# Patient Record
Sex: FEMALE | Race: WHITE | NOT HISPANIC OR LATINO | Employment: FULL TIME | URBAN - METROPOLITAN AREA
[De-identification: names, ages, dates, MRNs, and addresses within clinical notes are randomized per-mention and may not be internally consistent; named-entity substitution may affect disease eponyms.]

---

## 2018-05-02 ENCOUNTER — LAB VISIT (OUTPATIENT)
Dept: LAB | Facility: HOSPITAL | Age: 58
End: 2018-05-02
Attending: FAMILY MEDICINE
Payer: COMMERCIAL

## 2018-05-02 ENCOUNTER — OFFICE VISIT (OUTPATIENT)
Dept: INTERNAL MEDICINE | Facility: CLINIC | Age: 58
End: 2018-05-02
Payer: COMMERCIAL

## 2018-05-02 ENCOUNTER — TELEPHONE (OUTPATIENT)
Dept: INTERNAL MEDICINE | Facility: CLINIC | Age: 58
End: 2018-05-02

## 2018-05-02 VITALS
BODY MASS INDEX: 28.31 KG/M2 | OXYGEN SATURATION: 99 % | SYSTOLIC BLOOD PRESSURE: 128 MMHG | HEIGHT: 60 IN | HEART RATE: 59 BPM | WEIGHT: 144.19 LBS | DIASTOLIC BLOOD PRESSURE: 72 MMHG

## 2018-05-02 DIAGNOSIS — I25.10 CORONARY ARTERY DISEASE INVOLVING NATIVE HEART WITHOUT ANGINA PECTORIS, UNSPECIFIED VESSEL OR LESION TYPE: ICD-10-CM

## 2018-05-02 DIAGNOSIS — Z51.81 ENCOUNTER FOR MONITORING LONG-TERM PROTON PUMP INHIBITOR THERAPY: ICD-10-CM

## 2018-05-02 DIAGNOSIS — Z80.0 FAMILY HISTORY OF ESOPHAGEAL CANCER: ICD-10-CM

## 2018-05-02 DIAGNOSIS — R06.2 WHEEZING ON AUSCULTATION: ICD-10-CM

## 2018-05-02 DIAGNOSIS — I10 ESSENTIAL HYPERTENSION: ICD-10-CM

## 2018-05-02 DIAGNOSIS — Z79.899 ENCOUNTER FOR MONITORING LONG-TERM PROTON PUMP INHIBITOR THERAPY: ICD-10-CM

## 2018-05-02 DIAGNOSIS — K21.9 GASTROESOPHAGEAL REFLUX DISEASE WITHOUT ESOPHAGITIS: ICD-10-CM

## 2018-05-02 DIAGNOSIS — Z00.00 ANNUAL PHYSICAL EXAM: ICD-10-CM

## 2018-05-02 DIAGNOSIS — Z13.6 ENCOUNTER FOR LIPID SCREENING FOR CARDIOVASCULAR DISEASE: ICD-10-CM

## 2018-05-02 DIAGNOSIS — Z00.00 ANNUAL PHYSICAL EXAM: Primary | ICD-10-CM

## 2018-05-02 DIAGNOSIS — Z12.4 SCREENING FOR CERVICAL CANCER: ICD-10-CM

## 2018-05-02 DIAGNOSIS — Z12.11 ENCOUNTER FOR SCREENING COLONOSCOPY: ICD-10-CM

## 2018-05-02 DIAGNOSIS — Z87.891 FORMER SMOKER: ICD-10-CM

## 2018-05-02 DIAGNOSIS — Z13.220 ENCOUNTER FOR LIPID SCREENING FOR CARDIOVASCULAR DISEASE: ICD-10-CM

## 2018-05-02 DIAGNOSIS — Z12.9 SCREENING FOR CANCER: ICD-10-CM

## 2018-05-02 DIAGNOSIS — Z12.2 ENCOUNTER FOR SCREENING FOR LUNG CANCER: ICD-10-CM

## 2018-05-02 DIAGNOSIS — Z12.31 SCREENING MAMMOGRAM, ENCOUNTER FOR: ICD-10-CM

## 2018-05-02 LAB
25(OH)D3+25(OH)D2 SERPL-MCNC: 11 NG/ML
ALBUMIN SERPL BCP-MCNC: 4.1 G/DL
ALP SERPL-CCNC: 101 U/L
ALT SERPL W/O P-5'-P-CCNC: 11 U/L
ANION GAP SERPL CALC-SCNC: 9 MMOL/L
AST SERPL-CCNC: 20 U/L
BASOPHILS # BLD AUTO: 0.02 K/UL
BASOPHILS NFR BLD: 0.3 %
BILIRUB SERPL-MCNC: 0.9 MG/DL
BUN SERPL-MCNC: 14 MG/DL
CALCIUM SERPL-MCNC: 9.8 MG/DL
CHLORIDE SERPL-SCNC: 102 MMOL/L
CHOLEST SERPL-MCNC: 162 MG/DL
CHOLEST/HDLC SERPL: 3.3 {RATIO}
CO2 SERPL-SCNC: 28 MMOL/L
CREAT SERPL-MCNC: 0.9 MG/DL
DIFFERENTIAL METHOD: NORMAL
EOSINOPHIL # BLD AUTO: 0.1 K/UL
EOSINOPHIL NFR BLD: 0.8 %
ERYTHROCYTE [DISTWIDTH] IN BLOOD BY AUTOMATED COUNT: 13.5 %
EST. GFR  (AFRICAN AMERICAN): >60 ML/MIN/1.73 M^2
EST. GFR  (NON AFRICAN AMERICAN): >60 ML/MIN/1.73 M^2
GLUCOSE SERPL-MCNC: 94 MG/DL
HCT VFR BLD AUTO: 42.1 %
HDLC SERPL-MCNC: 49 MG/DL
HDLC SERPL: 30.2 %
HGB BLD-MCNC: 13.7 G/DL
IMM GRANULOCYTES # BLD AUTO: 0.02 K/UL
IMM GRANULOCYTES NFR BLD AUTO: 0.3 %
LDLC SERPL CALC-MCNC: 98.6 MG/DL
LYMPHOCYTES # BLD AUTO: 1.5 K/UL
LYMPHOCYTES NFR BLD: 21.1 %
MAGNESIUM SERPL-MCNC: 2.1 MG/DL
MCH RBC QN AUTO: 30.2 PG
MCHC RBC AUTO-ENTMCNC: 32.5 G/DL
MCV RBC AUTO: 93 FL
MONOCYTES # BLD AUTO: 0.6 K/UL
MONOCYTES NFR BLD: 8.5 %
NEUTROPHILS # BLD AUTO: 5 K/UL
NEUTROPHILS NFR BLD: 69 %
NONHDLC SERPL-MCNC: 113 MG/DL
NRBC BLD-RTO: 0 /100 WBC
PLATELET # BLD AUTO: 224 K/UL
PMV BLD AUTO: 11.8 FL
POTASSIUM SERPL-SCNC: 4.4 MMOL/L
PROT SERPL-MCNC: 8.2 G/DL
RBC # BLD AUTO: 4.53 M/UL
SODIUM SERPL-SCNC: 139 MMOL/L
TRIGL SERPL-MCNC: 72 MG/DL
TSH SERPL DL<=0.005 MIU/L-ACNC: 1.29 UIU/ML
VIT B12 SERPL-MCNC: 229 PG/ML
WBC # BLD AUTO: 7.29 K/UL

## 2018-05-02 PROCEDURE — 99386 PREV VISIT NEW AGE 40-64: CPT | Mod: S$GLB,,, | Performed by: FAMILY MEDICINE

## 2018-05-02 PROCEDURE — 85025 COMPLETE CBC W/AUTO DIFF WBC: CPT

## 2018-05-02 PROCEDURE — 36415 COLL VENOUS BLD VENIPUNCTURE: CPT | Mod: PO

## 2018-05-02 PROCEDURE — 3074F SYST BP LT 130 MM HG: CPT | Mod: CPTII,S$GLB,, | Performed by: FAMILY MEDICINE

## 2018-05-02 PROCEDURE — 84443 ASSAY THYROID STIM HORMONE: CPT

## 2018-05-02 PROCEDURE — 82607 VITAMIN B-12: CPT

## 2018-05-02 PROCEDURE — 80053 COMPREHEN METABOLIC PANEL: CPT

## 2018-05-02 PROCEDURE — 3078F DIAST BP <80 MM HG: CPT | Mod: CPTII,S$GLB,, | Performed by: FAMILY MEDICINE

## 2018-05-02 PROCEDURE — 82306 VITAMIN D 25 HYDROXY: CPT

## 2018-05-02 PROCEDURE — 99999 PR PBB SHADOW E&M-NEW PATIENT-LVL V: CPT | Mod: PBBFAC,,, | Performed by: FAMILY MEDICINE

## 2018-05-02 PROCEDURE — 80061 LIPID PANEL: CPT

## 2018-05-02 PROCEDURE — 83735 ASSAY OF MAGNESIUM: CPT

## 2018-05-02 PROCEDURE — 83036 HEMOGLOBIN GLYCOSYLATED A1C: CPT

## 2018-05-02 RX ORDER — OMEPRAZOLE 40 MG/1
40 CAPSULE, DELAYED RELEASE ORAL DAILY
Qty: 90 CAPSULE | Refills: 0 | Status: SHIPPED | OUTPATIENT
Start: 2018-05-02 | End: 2018-07-30 | Stop reason: SDUPTHER

## 2018-05-02 RX ORDER — SIMVASTATIN 40 MG/1
40 TABLET, FILM COATED ORAL NIGHTLY
COMMUNITY
Start: 2018-03-19 | End: 2018-05-15

## 2018-05-02 RX ORDER — METOPROLOL SUCCINATE 50 MG/1
50 TABLET, EXTENDED RELEASE ORAL DAILY
COMMUNITY
End: 2018-05-02 | Stop reason: SDUPTHER

## 2018-05-02 RX ORDER — SIMETHICONE 125 MG
125 CAPSULE ORAL EVERY OTHER DAY
COMMUNITY
End: 2024-03-26 | Stop reason: ALTCHOICE

## 2018-05-02 RX ORDER — LOSARTAN POTASSIUM 100 MG/1
100 TABLET ORAL DAILY
COMMUNITY
Start: 2018-03-17 | End: 2018-08-28 | Stop reason: SDUPTHER

## 2018-05-02 RX ORDER — NITROGLYCERIN 0.4 MG/1
0.4 TABLET SUBLINGUAL EVERY 5 MIN PRN
COMMUNITY
End: 2019-06-13 | Stop reason: SDUPTHER

## 2018-05-02 RX ORDER — METOPROLOL SUCCINATE 50 MG/1
50 TABLET, EXTENDED RELEASE ORAL DAILY
Qty: 90 TABLET | Refills: 1 | Status: SHIPPED | OUTPATIENT
Start: 2018-05-02 | End: 2018-11-07 | Stop reason: SDUPTHER

## 2018-05-02 RX ORDER — OMEPRAZOLE 20 MG/1
20 CAPSULE, DELAYED RELEASE ORAL DAILY
COMMUNITY
End: 2018-05-02 | Stop reason: SDUPTHER

## 2018-05-02 RX ORDER — CLOPIDOGREL BISULFATE 75 MG/1
75 TABLET ORAL DAILY
COMMUNITY
Start: 2018-04-29 | End: 2018-05-15

## 2018-05-02 RX ORDER — ASPIRIN 81 MG/1
81 TABLET ORAL DAILY
COMMUNITY

## 2018-05-02 NOTE — TELEPHONE ENCOUNTER
schedule pt for gyn on 6/7/18. Cancelled x ray. Pt verbalized understanding. Pt has no further questions or concerns.

## 2018-05-02 NOTE — PROGRESS NOTES
Subjective:       Patient ID: Alanna Angelo is a 58 y.o. female.    Chief Complaint: Gas and Establish Care    HPI 59 y/o female former smoker (quit yesterday) with CAD s/p PTCA 2008 on Plavix, HTN is here to establish care.  She has the feeling like she has to burp but she cannot, she has been taking gas x which helps some, she feels a little nauseated, denies v/d/constipation, her normal is every 2-3 days, denies straining or hard stools, says she has heartburn and taking prilosec helps she has been on it for over 8 years, she takes it everyday and its controlled tried zantac and it did not help, denies cp/sob, cough for the past week, yellow sputum, had cold sx a week ago which resolved, denies urinary sx.  Sleeping fair, appetite good.  HTN: on bb and arb, bp above goal  GYN; hx of L breast biopsy benign 2001, mmg due  CAD: has 2 stents, followed by Dr. Lee cardiology, last seen a year ago, on bb, baby asa, Plavix  Colonoscopy: never done  Eye exam due  Dental utd  Vaccines: thinks she is utd  Reviewed outside records    Review of Systems   Constitutional: Negative for activity change, appetite change, fatigue and fever.   Respiratory: Negative for cough and shortness of breath.    Cardiovascular: Negative for chest pain, palpitations and leg swelling.   Gastrointestinal: Negative for constipation, diarrhea, nausea and vomiting.   Genitourinary: Negative for difficulty urinating and dysuria.   Skin: Negative for rash.   Neurological: Negative for dizziness and light-headedness.   Psychiatric/Behavioral: Negative for sleep disturbance.       Objective:      /72   Pulse (!) 59   Ht 5' (1.524 m)   Wt 65.4 kg (144 lb 2.9 oz)   SpO2 99%   BMI 28.16 kg/m²   Physical Exam   Constitutional: She appears well-developed and well-nourished.   HENT:   Head: Normocephalic and atraumatic.   Mouth/Throat: No oropharyngeal exudate.   Neck: Normal range of motion. Neck supple. No thyromegaly present.    Cardiovascular: Normal rate, regular rhythm and normal heart sounds.    Pulmonary/Chest: Effort normal. No respiratory distress. She has wheezes.   Abdominal: Soft. Bowel sounds are normal. She exhibits no distension. There is no tenderness.   Musculoskeletal: She exhibits no edema.   Lymphadenopathy:     She has no cervical adenopathy.   Neurological: She is alert.   Skin: Skin is warm and dry.   Psychiatric: She has a normal mood and affect.   Nursing note and vitals reviewed.      Assessment:       1. Annual physical exam    2. Essential hypertension    3. Screening mammogram, encounter for    4. Encounter for screening colonoscopy    5. Coronary artery disease involving native heart without angina pectoris, unspecified vessel or lesion type    6. Encounter for lipid screening for cardiovascular disease    7. Former smoker    8. Screening for cancer    9. Encounter for screening for lung cancer    10. Wheezing on auscultation    11. Encounter for monitoring long-term proton pump inhibitor therapy    12. Gastroesophageal reflux disease without esophagitis    13. Family history of esophageal cancer    14. Screening for cervical cancer        Plan:   Alanna was seen today for [facility] care.    Diagnoses and all orders for this visit:    Annual physical exam  -     CBC auto differential; Future  -     Comprehensive metabolic panel; Future  -     Hemoglobin A1c; Future  -     Lipid panel; Future  -     TSH; Future  -     Ambulatory referral to Obstetrics / Gynecology    Essential hypertension  -     CBC auto differential; Future  -     Comprehensive metabolic panel; Future  -     Hemoglobin A1c; Future  -     TSH; Future  -     metoprolol succinate (TOPROL-XL) 50 MG 24 hr tablet; Take 1 tablet (50 mg total) by mouth once daily.    Screening mammogram, encounter for  -     Mammo Digital Screening Bilat with Tomosynthesis CAD; Future    Encounter for screening colonoscopy  -     Case request GI:  COLONOSCOPY  -     Case request GI: ESOPHAGOGASTRODUODENOSCOPY (EGD)    Coronary artery disease involving native heart without angina pectoris, unspecified vessel or lesion type  -     CBC auto differential; Future  -     Comprehensive metabolic panel; Future  -     Hemoglobin A1c; Future  -     Lipid panel; Future  -     metoprolol succinate (TOPROL-XL) 50 MG 24 hr tablet; Take 1 tablet (50 mg total) by mouth once daily.    Encounter for lipid screening for cardiovascular disease  -     Lipid panel; Future    Former smoker  -     CT Chest Lung Screening Low Dose; Future  -     X-Ray Chest PA And Lateral; Future  -     Case request GI: ESOPHAGOGASTRODUODENOSCOPY (EGD)    Screening for cancer  -     CT Chest Lung Screening Low Dose; Future    Encounter for screening for lung cancer    Wheezing on auscultation  -     X-Ray Chest PA And Lateral; Future    Encounter for monitoring long-term proton pump inhibitor therapy  -     omeprazole (PRILOSEC) 40 MG capsule; Take 1 capsule (40 mg total) by mouth once daily.  -     Vitamin B12; Future  -     Vitamin D; Future  -     Magnesium; Future    Gastroesophageal reflux disease without esophagitis  -     Case request GI: ESOPHAGOGASTRODUODENOSCOPY (EGD)  -     omeprazole (PRILOSEC) 40 MG capsule; Take 1 capsule (40 mg total) by mouth once daily.  -     Vitamin B12; Future  -     Vitamin D; Future  -     Magnesium; Future    Family history of esophageal cancer  -     Case request GI: ESOPHAGOGASTRODUODENOSCOPY (EGD)    Screening for cervical cancer  -     Ambulatory referral to Obstetrics / Gynecology

## 2018-05-02 NOTE — TELEPHONE ENCOUNTER
Please her up with one of the ob/gyn here for pelvic, please cancel chest x-ray given she got in for her CT so fast.

## 2018-05-03 ENCOUNTER — TELEPHONE (OUTPATIENT)
Dept: INTERNAL MEDICINE | Facility: CLINIC | Age: 58
End: 2018-05-03

## 2018-05-03 DIAGNOSIS — R79.89 LOW VITAMIN B12 LEVEL: ICD-10-CM

## 2018-05-03 DIAGNOSIS — E55.9 VITAMIN D DEFICIENCY: ICD-10-CM

## 2018-05-03 LAB
ESTIMATED AVG GLUCOSE: 94 MG/DL
HBA1C MFR BLD HPLC: 4.9 %

## 2018-05-03 NOTE — TELEPHONE ENCOUNTER
Informed pt of Dr. Devries advice. Pt verbalized understanding. Pt states that she will  the vitamins and start taking them. Pt has no further questions or concerns.

## 2018-05-03 NOTE — TELEPHONE ENCOUNTER
Please let her know her blood work looks good.  She is vitamin D deficient.  I recommend she take over-the-counter vitamin D3 2000 international units daily.  Additionally, her B12 is very low.  I would recommend she start B12 shots.  I sent the prescription to the pharmacy so she can get them at home.

## 2018-05-07 ENCOUNTER — HOSPITAL ENCOUNTER (OUTPATIENT)
Dept: RADIOLOGY | Facility: HOSPITAL | Age: 58
Discharge: HOME | End: 2018-05-07
Attending: FAMILY MEDICINE
Payer: COMMERCIAL

## 2018-05-07 ENCOUNTER — TELEPHONE (OUTPATIENT)
Dept: INTERNAL MEDICINE | Facility: CLINIC | Age: 58
End: 2018-05-07

## 2018-05-07 ENCOUNTER — HOSPITAL ENCOUNTER (OUTPATIENT)
Dept: RADIOLOGY | Facility: HOSPITAL | Age: 58
Discharge: HOME | End: 2018-05-07
Attending: FAMILY MEDICINE

## 2018-05-07 ENCOUNTER — TELEPHONE (OUTPATIENT)
Dept: ENDOSCOPY | Facility: HOSPITAL | Age: 58
End: 2018-05-07

## 2018-05-07 DIAGNOSIS — Z12.9 SCREENING FOR CANCER: ICD-10-CM

## 2018-05-07 DIAGNOSIS — I25.10 CORONARY ARTERY DISEASE INVOLVING NATIVE CORONARY ARTERY OF NATIVE HEART WITHOUT ANGINA PECTORIS: ICD-10-CM

## 2018-05-07 DIAGNOSIS — F17.200 SMOKER: Primary | ICD-10-CM

## 2018-05-07 DIAGNOSIS — Z87.891 FORMER SMOKER: ICD-10-CM

## 2018-05-07 DIAGNOSIS — R93.89 ABNORMAL CHEST X-RAY: ICD-10-CM

## 2018-05-07 DIAGNOSIS — Z12.31 SCREENING MAMMOGRAM, ENCOUNTER FOR: ICD-10-CM

## 2018-05-07 PROCEDURE — 77067 SCR MAMMO BI INCL CAD: CPT | Mod: 26,,, | Performed by: RADIOLOGY

## 2018-05-07 PROCEDURE — 77067 SCR MAMMO BI INCL CAD: CPT | Mod: TC

## 2018-05-07 PROCEDURE — 76497 UNLISTED CT PROCEDURE: CPT | Mod: TC

## 2018-05-07 PROCEDURE — 77063 BREAST TOMOSYNTHESIS BI: CPT | Mod: 26,,, | Performed by: RADIOLOGY

## 2018-05-07 RX ORDER — CYANOCOBALAMIN 1000 UG/ML
INJECTION, SOLUTION INTRAMUSCULAR; SUBCUTANEOUS
Refills: 1 | COMMUNITY
Start: 2018-05-03 | End: 2018-05-07

## 2018-05-07 NOTE — TELEPHONE ENCOUNTER
Cardiology notes reviewed from outside physician do not show any stress test or angiogram, please find out when the last time she had a stress test done and see if we can get a copy of it from her previous cardiologist

## 2018-05-07 NOTE — TELEPHONE ENCOUNTER
Patient needs to be scheduled for EGD and Colonoscopy, can patient hold Plavix 5 days prior to procedures per Endoscopy protocol?

## 2018-05-07 NOTE — TELEPHONE ENCOUNTER
Schedule pt with pulmonary on 5/21/18 and cards on 5/15/18. Pt verbalized understanding. Pt has no further questions or concerns.

## 2018-05-07 NOTE — TELEPHONE ENCOUNTER
Called and discussed results of CT and mammogram.  Please assist with Pulmonary and Cardiology appointments.

## 2018-05-07 NOTE — TELEPHONE ENCOUNTER
Lm for cardiology office in regards stress test or angiogram. Pt states that she had an angiogram in the past.

## 2018-05-08 ENCOUNTER — TELEPHONE (OUTPATIENT)
Dept: ENDOSCOPY | Facility: HOSPITAL | Age: 58
End: 2018-05-08

## 2018-05-08 DIAGNOSIS — Z12.11 SPECIAL SCREENING FOR MALIGNANT NEOPLASMS, COLON: Primary | ICD-10-CM

## 2018-05-08 RX ORDER — SODIUM, POTASSIUM,MAG SULFATES 17.5-3.13G
1 SOLUTION, RECONSTITUTED, ORAL ORAL ONCE
Qty: 1 BOTTLE | Refills: 0 | Status: SHIPPED | OUTPATIENT
Start: 2018-05-08 | End: 2018-05-08

## 2018-05-08 NOTE — TELEPHONE ENCOUNTER
Spoke with [facility] and they state that they will get back with us in regards to the stress test and angiogram.

## 2018-05-08 NOTE — TELEPHONE ENCOUNTER
Carmen Devries MD   You 16 hours ago (4:30 PM)      yes (Routing comment)          You routed conversation to Carmen Devries MD 18 hours ago (2:20 PM)      You 18 hours ago (2:18 PM)         Patient needs to be scheduled for EGD and Colonoscopy, can patient hold Plavix 5 days prior to procedures per Endoscopy protocol?

## 2018-05-15 ENCOUNTER — HOSPITAL ENCOUNTER (OUTPATIENT)
Dept: CARDIOLOGY | Facility: CLINIC | Age: 58
Discharge: HOME | End: 2018-05-15
Payer: COMMERCIAL

## 2018-05-15 ENCOUNTER — OFFICE VISIT (OUTPATIENT)
Dept: CARDIOLOGY | Facility: CLINIC | Age: 58
End: 2018-05-15
Payer: COMMERCIAL

## 2018-05-15 VITALS
DIASTOLIC BLOOD PRESSURE: 67 MMHG | HEIGHT: 60 IN | SYSTOLIC BLOOD PRESSURE: 143 MMHG | BODY MASS INDEX: 28.99 KG/M2 | WEIGHT: 147.69 LBS | HEART RATE: 62 BPM

## 2018-05-15 DIAGNOSIS — I25.10 CORONARY ARTERY DISEASE INVOLVING NATIVE CORONARY ARTERY OF NATIVE HEART WITHOUT ANGINA PECTORIS: ICD-10-CM

## 2018-05-15 DIAGNOSIS — Z87.891 FORMER SMOKER: ICD-10-CM

## 2018-05-15 DIAGNOSIS — I10 ESSENTIAL HYPERTENSION: Primary | ICD-10-CM

## 2018-05-15 DIAGNOSIS — I10 ESSENTIAL HYPERTENSION: ICD-10-CM

## 2018-05-15 PROCEDURE — 3077F SYST BP >= 140 MM HG: CPT | Mod: CPTII,S$GLB,, | Performed by: INTERNAL MEDICINE

## 2018-05-15 PROCEDURE — 93000 ELECTROCARDIOGRAM COMPLETE: CPT | Mod: S$GLB,,, | Performed by: INTERNAL MEDICINE

## 2018-05-15 PROCEDURE — 3008F BODY MASS INDEX DOCD: CPT | Mod: CPTII,S$GLB,, | Performed by: INTERNAL MEDICINE

## 2018-05-15 PROCEDURE — 99999 PR PBB SHADOW E&M-EST. PATIENT-LVL III: CPT | Mod: PBBFAC,,, | Performed by: INTERNAL MEDICINE

## 2018-05-15 PROCEDURE — 99203 OFFICE O/P NEW LOW 30 MIN: CPT | Mod: S$GLB,,, | Performed by: INTERNAL MEDICINE

## 2018-05-15 PROCEDURE — 3078F DIAST BP <80 MM HG: CPT | Mod: CPTII,S$GLB,, | Performed by: INTERNAL MEDICINE

## 2018-05-15 RX ORDER — MELATONIN 1 MG/ML
2 LIQUID (ML) ORAL DAILY
COMMUNITY

## 2018-05-15 RX ORDER — ACETAMINOPHEN 500 MG
1 TABLET ORAL DAILY
COMMUNITY

## 2018-05-15 RX ORDER — ATORVASTATIN CALCIUM 40 MG/1
40 TABLET, FILM COATED ORAL DAILY
Qty: 90 TABLET | Refills: 3 | Status: SHIPPED | OUTPATIENT
Start: 2018-05-15 | End: 2019-05-18 | Stop reason: SDUPTHER

## 2018-05-15 NOTE — PATIENT INSTRUCTIONS
Stop plavix (clopidogrel).    Change simvastatin to atorvastatin 40 mg daily.    Fasting blood work in 3 months.    We discussed that 150 minutes a week of moderate intensity exercise is recommended to improve cardiovascular health.

## 2018-05-15 NOTE — PROGRESS NOTES
Subjective:   Patient ID:  Alanna Angelo is a 58 y.o. female who presents for evaluation of Coronary artery disease involving native coronary artery of       HPI: She presents today to establish care. She was previously followed by Dr Lee at . She has CAD and had PCi done in the early . She has no history of CHF or arrhythmias. She is currently asymptomatic. Alanna Angelo denies any chest pain, shortness of breath, PND, orthopnea, palpitations, leg edema, or syncope. She quit smoking two weeks ago. She works in day and takes care of her grandson. She does not exercise on a regular basis. Her diet is low is fruits and vegetables.    Past Medical History:   Diagnosis Date    Coronary artery disease     Hypertension        Past Surgical History:   Procedure Laterality Date    BREAST BIOPSY      BREAST SURGERY      L breast biopsy benign      SECTION      CORONARY ANGIOPLASTY WITH STENT PLACEMENT      2 stents    HEMORRHOID SURGERY         Social History     Social History    Marital status:      Spouse name: N/A    Number of children: 3    Years of education: N/A     Occupational History    Works at       Social History Main Topics    Smoking status: Former Smoker     Packs/day: 1.00     Years: 45.00     Types: Cigarettes     Start date: 1975     Quit date: 2018    Smokeless tobacco: Never Used    Alcohol use No    Drug use: Yes     Types: Marijuana      Comment: every so often used     Sexual activity: Not Asked     Other Topics Concern    None     Social History Narrative    None       Family History   Problem Relation Age of Onset    Rheum arthritis Mother     Stroke Father     Heart disease Father     Hypertension Father     Hyperlipidemia Father     Cancer Father         esophageal    Hypertension Brother     Hyperlipidemia Brother     Heart disease Brother     Diabetes Neg Hx        Patient's Medications   New Prescriptions     ATORVASTATIN (LIPITOR) 40 MG TABLET    Take 1 tablet (40 mg total) by mouth once daily.   Previous Medications    ASPIRIN (ECOTRIN) 81 MG EC TABLET    Take 81 mg by mouth once daily.    CHOLECALCIFEROL, VITAMIN D3, (VITAMIN D3) 2,000 UNIT CAP    Take 1 capsule by mouth once daily.    CYANOCOBALAMIN, VITAMIN B-12, 1,000 MCG/ML KIT    Inject 1,000 mcg as directed every 28 days.    LOSARTAN (COZAAR) 100 MG TABLET    Take 100 mg by mouth once daily.     METOPROLOL SUCCINATE (TOPROL-XL) 50 MG 24 HR TABLET    Take 1 tablet (50 mg total) by mouth once daily.    NITROGLYCERIN (NITROSTAT) 0.4 MG SL TABLET    Place 0.4 mg under the tongue every 5 (five) minutes as needed for Chest pain.    OMEGA-3 FATTY ACIDS-FISH OIL (FISH OIL) 360-1,200 MG CPDR    Take 2 capsules by mouth once daily.    OMEPRAZOLE (PRILOSEC) 40 MG CAPSULE    Take 1 capsule (40 mg total) by mouth once daily.    SIMETHICONE (GAS-X EXTRA STRENGTH) 125 MG CAP CAPSULE    Take 125 mg by mouth 4 (four) times daily as needed for Flatulence.   Modified Medications    No medications on file   Discontinued Medications    CLOPIDOGREL (PLAVIX) 75 MG TABLET    Take 75 mg by mouth once daily.     SIMVASTATIN (ZOCOR) 40 MG TABLET    Take 40 mg by mouth every evening.        Review of Systems   Constitution: Negative for weakness, malaise/fatigue and weight gain.   HENT: Negative for hearing loss.    Eyes: Negative for visual disturbance.   Cardiovascular: Negative for chest pain, claudication, dyspnea on exertion, leg swelling, near-syncope, orthopnea, palpitations, paroxysmal nocturnal dyspnea and syncope.   Respiratory: Negative for cough, shortness of breath, sleep disturbances due to breathing, snoring and wheezing.    Endocrine: Negative for cold intolerance, heat intolerance, polydipsia, polyphagia and polyuria.   Hematologic/Lymphatic: Negative for bleeding problem. Does not bruise/bleed easily.   Skin: Negative for rash and suspicious lesions.   Musculoskeletal:  Negative for arthritis, falls, joint pain, muscle weakness and myalgias.   Gastrointestinal: Negative for abdominal pain, change in bowel habit, constipation, diarrhea, heartburn, hematochezia, melena and nausea.   Genitourinary: Negative for hematuria and nocturia.   Neurological: Negative for excessive daytime sleepiness, dizziness, headaches, light-headedness and loss of balance.   Psychiatric/Behavioral: Negative for depression. The patient is not nervous/anxious.    Allergic/Immunologic: Negative for environmental allergies.       BP (!) 143/67 (BP Location: Left arm, Patient Position: Sitting, BP Method: Large (Automatic))   Pulse 62   Ht 5' (1.524 m)   Wt 67 kg (147 lb 11.3 oz)   BMI 28.85 kg/m²     Objective:   Physical Exam   Constitutional: She is oriented to person, place, and time. She appears well-developed and well-nourished.        HENT:   Head: Normocephalic and atraumatic.   Mouth/Throat: Oropharynx is clear and moist.   Eyes: Conjunctivae and EOM are normal. Pupils are equal, round, and reactive to light. No scleral icterus.   Neck: Normal range of motion. Neck supple. No hepatojugular reflux and no JVD present. No tracheal deviation present. No thyromegaly present.   Cardiovascular: Normal rate, regular rhythm, normal heart sounds and intact distal pulses.  PMI is not displaced.    Pulses:       Carotid pulses are 2+ on the right side, and 2+ on the left side.       Radial pulses are 2+ on the right side, and 2+ on the left side.        Dorsalis pedis pulses are 2+ on the right side, and 2+ on the left side.        Posterior tibial pulses are 2+ on the right side, and 2+ on the left side.   Pulmonary/Chest: Effort normal and breath sounds normal.   Abdominal: Soft. Bowel sounds are normal. She exhibits no distension and no mass. There is no hepatosplenomegaly. There is no tenderness.   Musculoskeletal: She exhibits no edema or tenderness.   Lymphadenopathy:     She has no cervical adenopathy.    Neurological: She is alert and oriented to person, place, and time.   Skin: Skin is warm and dry. No rash noted. No cyanosis or erythema. Nails show no clubbing.   Psychiatric: She has a normal mood and affect. Her speech is normal and behavior is normal.       Lab Results   Component Value Date     05/02/2018    K 4.4 05/02/2018     05/02/2018    CO2 28 05/02/2018    BUN 14 05/02/2018    CREATININE 0.9 05/02/2018    GLU 94 05/02/2018    HGBA1C 4.9 05/02/2018    MG 2.1 05/02/2018    AST 20 05/02/2018    ALT 11 05/02/2018    ALBUMIN 4.1 05/02/2018    PROT 8.2 05/02/2018    BILITOT 0.9 05/02/2018    WBC 7.29 05/02/2018    HGB 13.7 05/02/2018    HCT 42.1 05/02/2018    MCV 93 05/02/2018     05/02/2018    TSH 1.288 05/02/2018    CHOL 162 05/02/2018    HDL 49 05/02/2018    LDLCALC 98.6 05/02/2018    TRIG 72 05/02/2018       Assessment:     1. Essential hypertension : Her blood pressure was elevated today. She is going to start a home log. We discussed goal BP is < 130/80.   2. Former smoker : She quit two weeks ago.   3. Coronary artery disease involving native coronary artery of native heart without angina pectoris : She is asymptomatic. Stop plavix. Change simvastatin to atorvastatin 40 mg daily for high intensity treatment. Follow up FLP and liver enzymes in 3 months. Records requested from Dr Lee. We discussed that 150 minutes a week of moderate intensity exercise is recommended to improve cardiovascular health. Mediterranean style diet is recommended for CV health.         Plan:     Alanna was seen today for coronary artery disease involving native coronary artery of .    Diagnoses and all orders for this visit:    Essential hypertension  -     atorvastatin (LIPITOR) 40 MG tablet; Take 1 tablet (40 mg total) by mouth once daily.  -     Lipid panel; Future  -     Comprehensive metabolic panel; Future  -     EKG 12-lead; Future  -     Comprehensive metabolic panel; Future  -     Lipid panel;  Future    Former smoker  -     atorvastatin (LIPITOR) 40 MG tablet; Take 1 tablet (40 mg total) by mouth once daily.  -     Lipid panel; Future  -     Comprehensive metabolic panel; Future  -     EKG 12-lead; Future  -     Comprehensive metabolic panel; Future  -     Lipid panel; Future    Coronary artery disease involving native coronary artery of native heart without angina pectoris  -     atorvastatin (LIPITOR) 40 MG tablet; Take 1 tablet (40 mg total) by mouth once daily.  -     Lipid panel; Future  -     Comprehensive metabolic panel; Future  -     EKG 12-lead; Future  -     Comprehensive metabolic panel; Future  -     Lipid panel; Future        Thank you for allowing me to participate in this patient's care. Please do not hesitate to contact me with any questions or concerns.

## 2018-05-15 NOTE — LETTER
May 15, 2018      Carmen Devries MD  [address] [place] [address]  [address]  [place] [address]           [facility] - Cardiology  [address]  [place] [address]  Phone: [phone]          Patient: Alanna Angelo   MR Number: [id number]   YOB: 1960   Date of Visit: 5/15/2018       Dear Dr. Carmen Devries:    Thank you for referring Alanna Angelo to me for evaluation. Attached you will find relevant portions of my assessment and plan of care.    If you have questions, please do not hesitate to call me. I look forward to following Alanna Angelo along with you.    Sincerely,    Qing Wakefield MD    Enclosure  CC:  No Recipients    If you would like to receive this communication electronically, please contact [email or web address] or [phone] to request more information on [employer] access.    For providers and/or their staff who would like to refer a patient to [facility], please contact us through our one-stop-shop provider referral line, [facility] , at [phone].    If you feel you have received this communication in error or would no longer like to receive these types of communications, please e-mail [email or web address]

## 2018-05-17 ENCOUNTER — CLINICAL SUPPORT (OUTPATIENT)
Dept: INTERNAL MEDICINE | Facility: CLINIC | Age: 58
End: 2018-05-17
Payer: COMMERCIAL

## 2018-05-17 ENCOUNTER — TELEPHONE (OUTPATIENT)
Dept: INTERNAL MEDICINE | Facility: CLINIC | Age: 58
End: 2018-05-17

## 2018-05-17 VITALS — DIASTOLIC BLOOD PRESSURE: 70 MMHG | SYSTOLIC BLOOD PRESSURE: 136 MMHG

## 2018-05-17 NOTE — PROGRESS NOTES
Alanna Baer Gi 58 y.o. female is here today for Blood Pressure check.   History of HTN yes.      Patient verifies taking blood pressure medications. Last dose of blood pressure medication was taken at 6:20 AM. Patient took Losartan 100 mg and metoprolol 50 mg.       Current Outpatient Prescriptions:     aspirin (ECOTRIN) 81 MG EC tablet, Take 81 mg by mouth once daily., Disp: , Rfl:     atorvastatin (LIPITOR) 40 MG tablet, Take 1 tablet (40 mg total) by mouth once daily., Disp: 90 tablet, Rfl: 3    cholecalciferol, vitamin D3, (VITAMIN D3) 2,000 unit Cap, Take 1 capsule by mouth once daily., Disp: , Rfl:     cyanocobalamin, vitamin B-12, 1,000 mcg/mL Kit, Inject 1,000 mcg as directed every 28 days., Disp: 1 kit, Rfl: 1    losartan (COZAAR) 100 MG tablet, Take 100 mg by mouth once daily. , Disp: , Rfl:     metoprolol succinate (TOPROL-XL) 50 MG 24 hr tablet, Take 1 tablet (50 mg total) by mouth once daily., Disp: 90 tablet, Rfl: 1    nitroGLYCERIN (NITROSTAT) 0.4 MG SL tablet, Place 0.4 mg under the tongue every 5 (five) minutes as needed for Chest pain., Disp: , Rfl:     omega-3 fatty acids-fish oil (FISH OIL) 360-1,200 mg CpDR, Take 2 capsules by mouth once daily., Disp: , Rfl:     omeprazole (PRILOSEC) 40 MG capsule, Take 1 capsule (40 mg total) by mouth once daily., Disp: 90 capsule, Rfl: 0    simethicone (GAS-X EXTRA STRENGTH) 125 mg Cap capsule, Take 125 mg by mouth 4 (four) times daily as needed for Flatulence., Disp: , Rfl:     Does patient have record of home blood pressure readings no.     Patient is asymptomatic. Patient denies Headache, chest pain, blurred vision, left arm pain, right arm pain, leg pain, and/or swelling in the ankles or feet. Patient has no complaints today.     Blood pressure reading was 136/70, Pulse 60.     Pt's Home blood pressure machine read 130/76.     Pt's Home blood pressure machine is accurate.     Review of patient's allergies indicates:  No Known  Allergies  Creatinine   Date Value Ref Range Status   05/02/2018 0.9 0.5 - 1.4 mg/dL Final     Sodium   Date Value Ref Range Status   05/02/2018 139 136 - 145 mmol/L Final     Potassium   Date Value Ref Range Status   05/02/2018 4.4 3.5 - 5.1 mmol/L Final   ]    Dr. Devries notified.

## 2018-05-17 NOTE — TELEPHONE ENCOUNTER
Please let her know that I would like to see her back in 3 months we do not have to make any medication changes at this time.

## 2018-05-17 NOTE — TELEPHONE ENCOUNTER
Informed pt of Dr. Devries advise. Pt verbalized understanding. Pt states that she will call back to make f/u appt in 3 months.

## 2018-05-17 NOTE — TELEPHONE ENCOUNTER
Alanna Baer Gi 58 y.o. female is here today for Blood Pressure check.   History of HTN yes.      Patient verifies taking blood pressure medications. Last dose of blood pressure medication was taken at 6:20 AM. Patient took Losartan 100 mg and metoprolol 50 mg.     Does patient have record of home blood pressure readings no.     Patient is asymptomatic. Patient denies Headache, chest pain, blurred vision, left arm pain, right arm pain, leg pain, and/or swelling in the ankles or feet. Patient has no complaints today.     Blood pressure reading was 136/70, Pulse 60.     Pt's Home blood pressure machine read 130/76.     Pt's Home blood pressure machine is accurate.

## 2018-05-21 ENCOUNTER — OFFICE VISIT (OUTPATIENT)
Dept: PULMONOLOGY | Facility: CLINIC | Age: 58
End: 2018-05-21
Payer: COMMERCIAL

## 2018-05-21 ENCOUNTER — HOSPITAL ENCOUNTER (OUTPATIENT)
Dept: PULMONOLOGY | Facility: CLINIC | Age: 58
Discharge: HOME | End: 2018-05-21
Payer: COMMERCIAL

## 2018-05-21 VITALS
OXYGEN SATURATION: 98 % | SYSTOLIC BLOOD PRESSURE: 122 MMHG | BODY MASS INDEX: 29.3 KG/M2 | HEART RATE: 57 BPM | HEIGHT: 60 IN | RESPIRATION RATE: 14 BRPM | WEIGHT: 149.25 LBS | DIASTOLIC BLOOD PRESSURE: 64 MMHG

## 2018-05-21 DIAGNOSIS — R91.1 LUNG NODULE: ICD-10-CM

## 2018-05-21 DIAGNOSIS — J43.2 CENTRILOBULAR EMPHYSEMA: ICD-10-CM

## 2018-05-21 DIAGNOSIS — R91.8 MULTIPLE LUNG NODULES: ICD-10-CM

## 2018-05-21 DIAGNOSIS — F17.200 TOBACCO USE DISORDER: ICD-10-CM

## 2018-05-21 DIAGNOSIS — I25.10 CORONARY ARTERY DISEASE INVOLVING NATIVE CORONARY ARTERY OF NATIVE HEART WITHOUT ANGINA PECTORIS: Primary | ICD-10-CM

## 2018-05-21 LAB
PRE FEV1 FVC: 73
PRE FEV1: 1.8
PRE FVC: 2.47
PREDICTED FEV1 FVC: 83
PREDICTED FEV1: 2.12
PREDICTED FVC: 2.6

## 2018-05-21 PROCEDURE — 94729 DIFFUSING CAPACITY: CPT | Mod: S$GLB,,, | Performed by: INTERNAL MEDICINE

## 2018-05-21 PROCEDURE — 99204 OFFICE O/P NEW MOD 45 MIN: CPT | Mod: 25,S$GLB,, | Performed by: INTERNAL MEDICINE

## 2018-05-21 PROCEDURE — 94010 BREATHING CAPACITY TEST: CPT | Mod: S$GLB,,, | Performed by: INTERNAL MEDICINE

## 2018-05-21 PROCEDURE — 99999 PR PBB SHADOW E&M-EST. PATIENT-LVL IV: CPT | Mod: PBBFAC,,, | Performed by: INTERNAL MEDICINE

## 2018-05-21 PROCEDURE — 3008F BODY MASS INDEX DOCD: CPT | Mod: CPTII,S$GLB,, | Performed by: INTERNAL MEDICINE

## 2018-05-21 PROCEDURE — 3078F DIAST BP <80 MM HG: CPT | Mod: CPTII,S$GLB,, | Performed by: INTERNAL MEDICINE

## 2018-05-21 PROCEDURE — 3074F SYST BP LT 130 MM HG: CPT | Mod: CPTII,S$GLB,, | Performed by: INTERNAL MEDICINE

## 2018-05-21 NOTE — PATIENT INSTRUCTIONS
Spirometry and DLCO (phone report).  Urged to not restart smoking.  Repeat CT Chest and return visit 6 months.

## 2018-05-21 NOTE — PROGRESS NOTES
Subjective:       Patient ID: Alanna Angelo is a 58 y.o. female.    Chief Complaint: Abnormal Ct Scan    HPI HISTORY OF PRESENT ILLNESS:  Mrs. Angelo is a 58-year-old former smoker (quit   three weeks ago) who comes to follow up findings in a recent screening CT scan   of the chest.  She had this study as part of a recent general medical evaluation   due to her long smoking history.  She is not having any daily respiratory   symptoms.  She notes that since she stopped smoking three weeks ago, her morning   cough has begun to subside.  She does not feel that she is limited in daily   activities by shortness of breath or other respiratory symptoms.  She is not   having any nighttime respiratory problems.  She is not currently using any   prescribed medications for respiratory symptoms.    Mrs. Angelo does not know of any past lung problems.  She does have a history of   coronary artery disease and is status post coronary artery stent placement 6 to   8 years ago.  She is not having any prominent sinus or upper GI symptoms.    Mrs. Angelo estimates that she smoked as much as one pack of cigarettes per day   for 40 years.  As noted previously, she discontinued smoking within the last   three weeks.  Her family history is of note in that her father had tuberculosis.    Otherwise, she does not know of any family history for lung diseases.  She   recalls being screened as a child for tuberculosis and her screening studies   were negative.  She currently works in a nursery school.    DATA:  I reviewed the images from the recent CT scan of the chest.  There are no   acute cardiovascular abnormalities.  There is paraseptal emphysema, which is most   noticeable in the apices of the lungs.  There are also a few scattered cysts in   the other lung zones.  There are several subcentimeter nodular densities in the right   upper lobe.  There are no pleural abnormalities.  There are no   prior CT images available for  comparison.      CB/IN  dd: 05/21/2018 19:48:25 (CDT)  td: 05/22/2018 12:07:00 (CDT)  Doc ID   #[id number]  Job ID #[id number]    CC:       Review of Systems   Constitutional: Negative for fever and fatigue.   HENT: Negative for postnasal drip, sinus pressure, voice change and congestion.    Respiratory: Positive for cough and sputum production. Negative for shortness of breath, wheezing and dyspnea on extertion.    Cardiovascular: Negative for chest pain and leg swelling.   Genitourinary: Negative for difficulty urinating.   Musculoskeletal: Negative for arthralgias and back pain.   Skin: Negative for rash.   Gastrointestinal: Negative for abdominal pain and acid reflux.   Neurological: Negative for dizziness and weakness.   Hematological: Negative for adenopathy.       Objective:      Physical Exam   Constitutional: She is oriented to person, place, and time. She appears well-developed and well-nourished.   HENT:   Head: Normocephalic.   Nose: Nose normal.   Mouth/Throat: No oropharyngeal exudate.   Neck: Normal range of motion. No JVD present. No tracheal deviation present. No thyromegaly present.   Mild hoarseness.   Cardiovascular: Normal rate, regular rhythm and normal heart sounds.    No murmur heard.  Pulmonary/Chest: Symmetric chest wall expansion. No stridor. She has no wheezes. She has no rhonchi. She has no rales. She exhibits no tenderness.   Abdominal: Soft. There is no tenderness.   Musculoskeletal: She exhibits no edema.   Lymphadenopathy:     She has no cervical adenopathy.   Neurological: She is alert and oriented to person, place, and time.   Skin: Skin is warm and dry. No rash noted. No erythema. Nails show no clubbing.   Psychiatric: She has a normal mood and affect.   Vitals reviewed.    Personal Diagnostic Review    No flowsheet data found.      Assessment:       1. Coronary artery disease involving native coronary artery of native heart without angina pectoris    2. Multiple lung nodules    3.  Centrilobular emphysema    4. Tobacco use disorder    5. Lung nodule        Outpatient Encounter Prescriptions as of 5/21/2018   Medication Sig Dispense Refill    aspirin (ECOTRIN) 81 MG EC tablet Take 81 mg by mouth once daily.      atorvastatin (LIPITOR) 40 MG tablet Take 1 tablet (40 mg total) by mouth once daily. 90 tablet 3    cholecalciferol, vitamin D3, (VITAMIN D3) 2,000 unit Cap Take 1 capsule by mouth once daily.      cyanocobalamin, vitamin B-12, 1,000 mcg/mL Kit Inject 1,000 mcg as directed every 28 days. 1 kit 1    losartan (COZAAR) 100 MG tablet Take 100 mg by mouth once daily.       metoprolol succinate (TOPROL-XL) 50 MG 24 hr tablet Take 1 tablet (50 mg total) by mouth once daily. 90 tablet 1    nitroGLYCERIN (NITROSTAT) 0.4 MG SL tablet Place 0.4 mg under the tongue every 5 (five) minutes as needed for Chest pain.      omega-3 fatty acids-fish oil (FISH OIL) 360-1,200 mg CpDR Take 2 capsules by mouth once daily.      omeprazole (PRILOSEC) 40 MG capsule Take 1 capsule (40 mg total) by mouth once daily. 90 capsule 0    simethicone (GAS-X EXTRA STRENGTH) 125 mg Cap capsule Take 125 mg by mouth 4 (four) times daily as needed for Flatulence.       No facility-administered encounter medications on file as of 5/21/2018.      Orders Placed This Encounter   Procedures    CT Chest Without Contrast     Standing Status:   Future     Standing Expiration Date:   5/21/2019     Order Specific Question:   May the Radiologist modify the order per protocol to meet the clinical needs of the patient?     Answer:   Yes    Spirometry without bronchodilator     Standing Status:   Future     Number of Occurrences:   1     Standing Expiration Date:   5/21/2019    DLCO-Carbon Monoxide Diffusing Capacity     Standing Status:   Future     Number of Occurrences:   1     Standing Expiration Date:   5/21/2019     Plan:     Spirometry and DLCO (phone report).  Urged to not restart smoking.  Repeat CT Chest and return  visit 6 months.

## 2018-05-21 NOTE — LETTER
May 21, 2018      Carmen Devries MD  [address]  [address]  [place] [address]           [facility] - Pulmonary [facility]  [address]  [place] [address]  Phone: [phone]          Patient: Alanna Angelo   MR Number: [id number]   YOB: 1960   Date of Visit: 5/21/2018       Dear Dr. Carmen Devries:    Thank you for referring Alanna Angelo to me for evaluation. Attached you will find relevant portions of my assessment and plan of care.    If you have questions, please do not hesitate to call me. I look forward to following lAanna Angelo along with you.    Sincerely,    JR Vega MD    Enclosure  CC:  No Recipients    If you would like to receive this communication electronically, please contact [email or web address] or [phone] to request more information on TelePharm Link access.    For providers and/or their staff who would like to refer a patient to [facility], please contact us through our one-stop-shop provider referral line, [facility] [place], at [phone].    If you feel you have received this communication in error or would no longer like to receive these types of communications, please e-mail [email or web address]

## 2018-05-24 ENCOUNTER — TELEPHONE (OUTPATIENT)
Dept: PULMONOLOGY | Facility: CLINIC | Age: 58
End: 2018-05-24

## 2018-05-25 NOTE — TELEPHONE ENCOUNTER
Pt informed that recent PFTs show spirometry and DLCO are within the range of normal.  Plan repeat CT and return visit in 6 months to monitor lung nodules.

## 2018-06-08 DIAGNOSIS — Z11.59 NEED FOR HEPATITIS C SCREENING TEST: ICD-10-CM

## 2018-06-11 ENCOUNTER — SURGERY (OUTPATIENT)
Age: 58
End: 2018-06-11

## 2018-06-11 ENCOUNTER — HOSPITAL ENCOUNTER (OUTPATIENT)
Facility: HOSPITAL | Age: 58
Discharge: HOME | End: 2018-06-11
Attending: INTERNAL MEDICINE | Admitting: INTERNAL MEDICINE
Payer: COMMERCIAL

## 2018-06-11 ENCOUNTER — ANESTHESIA (OUTPATIENT)
Dept: ENDOSCOPY | Facility: HOSPITAL | Age: 58
End: 2018-06-11
Payer: COMMERCIAL

## 2018-06-11 ENCOUNTER — ANESTHESIA EVENT (OUTPATIENT)
Dept: ENDOSCOPY | Facility: HOSPITAL | Age: 58
End: 2018-06-11
Payer: COMMERCIAL

## 2018-06-11 VITALS
SYSTOLIC BLOOD PRESSURE: 129 MMHG | WEIGHT: 143 LBS | DIASTOLIC BLOOD PRESSURE: 56 MMHG | HEIGHT: 64 IN | BODY MASS INDEX: 24.41 KG/M2 | RESPIRATION RATE: 18 BRPM | HEART RATE: 53 BPM | TEMPERATURE: 98 F | OXYGEN SATURATION: 99 %

## 2018-06-11 DIAGNOSIS — K21.9 GASTROESOPHAGEAL REFLUX DISEASE WITHOUT ESOPHAGITIS: Primary | ICD-10-CM

## 2018-06-11 DIAGNOSIS — Z12.11 COLON CANCER SCREENING: ICD-10-CM

## 2018-06-11 PROCEDURE — 43239 EGD BIOPSY SINGLE/MULTIPLE: CPT | Mod: 51,,, | Performed by: INTERNAL MEDICINE

## 2018-06-11 PROCEDURE — 63600175 PHARM REV CODE 636 W HCPCS: Performed by: NURSE ANESTHETIST, CERTIFIED REGISTERED

## 2018-06-11 PROCEDURE — 88305 TISSUE EXAM BY PATHOLOGIST: CPT | Performed by: PATHOLOGY

## 2018-06-11 PROCEDURE — G0121 COLON CA SCRN NOT HI RSK IND: HCPCS | Performed by: INTERNAL MEDICINE

## 2018-06-11 PROCEDURE — 37000008 HC ANESTHESIA 1ST 15 MINUTES: Performed by: INTERNAL MEDICINE

## 2018-06-11 PROCEDURE — 27201012 HC FORCEPS, HOT/COLD, DISP: Performed by: INTERNAL MEDICINE

## 2018-06-11 PROCEDURE — 88305 TISSUE EXAM BY PATHOLOGIST: CPT | Mod: 26,,, | Performed by: PATHOLOGY

## 2018-06-11 PROCEDURE — E9220 PRA ENDO ANESTHESIA: HCPCS | Mod: ,,, | Performed by: NURSE ANESTHETIST, CERTIFIED REGISTERED

## 2018-06-11 PROCEDURE — 25000003 PHARM REV CODE 250: Performed by: INTERNAL MEDICINE

## 2018-06-11 PROCEDURE — G0121 COLON CA SCRN NOT HI RSK IND: HCPCS | Mod: ,,, | Performed by: INTERNAL MEDICINE

## 2018-06-11 PROCEDURE — 43239 EGD BIOPSY SINGLE/MULTIPLE: CPT | Performed by: INTERNAL MEDICINE

## 2018-06-11 PROCEDURE — 37000009 HC ANESTHESIA EA ADD 15 MINS: Performed by: INTERNAL MEDICINE

## 2018-06-11 RX ORDER — SODIUM CHLORIDE 9 MG/ML
INJECTION, SOLUTION INTRAVENOUS CONTINUOUS
Status: DISCONTINUED | OUTPATIENT
Start: 2018-06-11 | End: 2018-06-11 | Stop reason: HOSPADM

## 2018-06-11 RX ORDER — PROPOFOL 10 MG/ML
VIAL (ML) INTRAVENOUS
Status: DISCONTINUED | OUTPATIENT
Start: 2018-06-11 | End: 2018-06-11

## 2018-06-11 RX ORDER — CLOPIDOGREL BISULFATE 75 MG/1
75 TABLET ORAL DAILY
COMMUNITY
End: 2018-07-16

## 2018-06-11 RX ORDER — LIDOCAINE HCL/PF 100 MG/5ML
SYRINGE (ML) INTRAVENOUS
Status: DISCONTINUED | OUTPATIENT
Start: 2018-06-11 | End: 2018-06-11

## 2018-06-11 RX ADMIN — PROPOFOL 30 MG: 10 INJECTION, EMULSION INTRAVENOUS at 07:06

## 2018-06-11 RX ADMIN — PROPOFOL 20 MG: 10 INJECTION, EMULSION INTRAVENOUS at 07:06

## 2018-06-11 RX ADMIN — LIDOCAINE HYDROCHLORIDE 75 MG: 20 INJECTION, SOLUTION INTRAVENOUS at 07:06

## 2018-06-11 RX ADMIN — PROPOFOL 20 MG: 10 INJECTION, EMULSION INTRAVENOUS at 08:06

## 2018-06-11 RX ADMIN — PROPOFOL 30 MG: 10 INJECTION, EMULSION INTRAVENOUS at 08:06

## 2018-06-11 RX ADMIN — PROPOFOL 100 MG: 10 INJECTION, EMULSION INTRAVENOUS at 07:06

## 2018-06-11 RX ADMIN — SODIUM CHLORIDE: 0.9 INJECTION, SOLUTION INTRAVENOUS at 07:06

## 2018-06-11 NOTE — H&P
Short Stay Endoscopy History and Physical    PCP - Carmen Devries MD    Procedure - EGD/Colonoscopy  ASA - per anesthesia  Mallampati - per anesthesia  History of Anesthesia problems - no  Family history Anesthesia problems -  no   Plan of anesthesia - MAC    HPI:  This is a 58 y.o. female here for evaluation of :     EGD - gerd  Colon - screening    ROS:  Constitutional: No fevers, chills, No weight loss  CV: No chest pain  Pulm: No cough, No shortness of breath  Ophtho: No vision changes  GI: see HPI  Derm: No rash    Medical History:  has a past medical history of Coronary artery disease and Hypertension.    Surgical History:  has a past surgical history that includes Breast surgery; Hemorrhoid surgery;  section; Breast biopsy; and Coronary angioplasty with stent ().    Family History: family history includes Cancer in her father; Heart disease in her brother and father; Hyperlipidemia in her brother and father; Hypertension in her brother and father; Rheum arthritis in her mother; Stroke in her father; Tuberculosis in her father.. Otherwise no colon cancer, inflammatory bowel disease, or GI malignancies.    Social History:  reports that she quit smoking about 5 weeks ago. Her smoking use included Cigarettes. She started smoking about 43 years ago. She has a 40.00 pack-year smoking history. She has never used smokeless tobacco. She reports that she does not drink alcohol or use drugs.    Review of patient's allergies indicates:  No Known Allergies    Medications:   Prescriptions Prior to Admission   Medication Sig Dispense Refill Last Dose    aspirin (ECOTRIN) 81 MG EC tablet Take 81 mg by mouth once daily.   6/10/2018 at Unknown time    atorvastatin (LIPITOR) 40 MG tablet Take 1 tablet (40 mg total) by mouth once daily. 90 tablet 3 6/10/2018 at Unknown time    cholecalciferol, vitamin D3, (VITAMIN D3) 2,000 unit Cap Take 1 capsule by mouth once daily.   2018 at 0530    clopidogrel (PLAVIX)  75 mg tablet Take 75 mg by mouth once daily.   6/1/2018    cyanocobalamin, vitamin B-12, 1,000 mcg/mL Kit Inject 1,000 mcg as directed every 28 days. 1 kit 1 Past Month at Unknown time    losartan (COZAAR) 100 MG tablet Take 100 mg by mouth once daily.    6/11/2018 at 0530    metoprolol succinate (TOPROL-XL) 50 MG 24 hr tablet Take 1 tablet (50 mg total) by mouth once daily. 90 tablet 1 6/11/2018 at 0530    omega-3 fatty acids-fish oil (FISH OIL) 360-1,200 mg CpDR Take 2 capsules by mouth once daily.   6/11/2018 at 0530    omeprazole (PRILOSEC) 40 MG capsule Take 1 capsule (40 mg total) by mouth once daily. 90 capsule 0 6/11/2018 at 0530    simethicone (GAS-X EXTRA STRENGTH) 125 mg Cap capsule Take 125 mg by mouth 4 (four) times daily as needed for Flatulence.   Past Week at Unknown time    nitroGLYCERIN (NITROSTAT) 0.4 MG SL tablet Place 0.4 mg under the tongue every 5 (five) minutes as needed for Chest pain.   Unknown at Unknown time       Physical Exam:    Vital Signs:   Vitals:    06/11/18 0722   BP: (!) 142/68   Pulse: 66   Resp: 16   Temp: 97.9 °F (36.6 °C)       General Appearance: Well appearing in no acute distress  Eyes:    No scleral icterus  ENT: Neck supple, Lips, mucosa, and tongue normal; teeth and gums normal  Lungs: CTA anteriorly  Heart:  Regular rate, S1, S2 normal, no murmurs heard.  Abdomen: Soft, non tender, non distended with normal bowel sounds. No hepatosplenomegaly, ascites, or mass.  Extremities: No edema  Skin: No rash    Labs:  Lab Results   Component Value Date    WBC 7.29 05/02/2018    HGB 13.7 05/02/2018    HCT 42.1 05/02/2018     05/02/2018    CHOL 162 05/02/2018    TRIG 72 05/02/2018    HDL 49 05/02/2018    ALT 11 05/02/2018    AST 20 05/02/2018     05/02/2018    K 4.4 05/02/2018     05/02/2018    CREATININE 0.9 05/02/2018    BUN 14 05/02/2018    CO2 28 05/02/2018    TSH 1.288 05/02/2018    HGBA1C 4.9 05/02/2018       I have explained the risks and benefits  of endoscopy procedures to the patient including but not limited to bleeding, perforation, infection, and death.      Yuri Ewing MD

## 2018-06-11 NOTE — [employer] PATIENT INSTRUCTIONS
Discharge Summary/Instructions after an Endoscopic Procedure  Patient Name: Alanna Angelo  Patient MRN: [id number]  Patient YOB: 1960  Monday, June 11, 2018  Yuri Ewing MD  RESTRICTIONS:  During your procedure today, you received medications for sedation.  These   medications may affect your judgment, balance and coordination.  Therefore,   for 24 hours, you have the following restrictions:   - DO NOT drive a car, operate machinery, make legal/financial decisions,   sign important papers or drink alcohol.    ACTIVITY:  Today: no heavy lifting, straining or running due to procedural   sedation/anesthesia.  The following day: return to full activity including work.  DIET:  Eat and drink normally unless instructed otherwise.     TREATMENT FOR COMMON SIDE EFFECTS:  - Mild abdominal pain, nausea, belching, bloating or excessive gas:  rest,   eat lightly and use a heating pad.  - Sore Throat: treat with throat lozenges and/or gargle with warm salt   water.  - Because air was used during the procedure, expelling large amounts of air   from your rectum or belching is normal.  - If a bowel prep was taken, you may not have a bowel movement for 1-3 days.    This is normal.  SYMPTOMS TO WATCH FOR AND REPORT TO YOUR PHYSICIAN:  1. Abdominal pain or bloating, other than gas cramps.  2. Chest pain.  3. Back pain.  4. Signs of infection such as: chills or fever occurring within 24 hours   after the procedure.  5. Rectal bleeding, which would show as bright red, maroon, or black stools.   (A tablespoon of blood from the rectum is not serious, especially if   hemorrhoids are present.)  6. Vomiting.  7. Weakness or dizziness.  GO DIRECTLY TO THE NEAREST EMERGENCY ROOM IF YOU HAVE ANY OF THE FOLLOWING:      Difficulty breathing              Chills and/or fever over 101 F   Persistent vomiting and/or vomiting blood   Severe abdominal pain   Severe chest pain   Black, tarry stools   Bleeding- more than one tablespoon   Any other  symptom or condition that you feel may need urgent attention  Your doctor recommends these additional instructions:  If any biopsies were taken, your doctors clinic will contact you in 1 to 2   weeks with any results.  - Discharge patient to home.   - Await pathology results.   - Repeat upper endoscopy in 3 years for surveillance based on pathology   results.   - The findings and recommendations were discussed with the designated   responsible adult.   For questions, problems or results please call your physician - Yuri Ewing MD at Work:  [phone].  [facility], EMERGENCY ROOM PHONE NUMBER: [phone]  IF A COMPLICATION OR EMERGENCY SITUATION ARISES AND YOU ARE UNABLE TO REACH   YOUR PHYSICIAN - GO DIRECTLY TO THE EMERGENCY ROOM.  Yuri Ewing MD  6/11/2018 7:50:35 AM  This report has been verified and signed electronically.  [employer]

## 2018-06-11 NOTE — ANESTHESIA POSTPROCEDURE EVALUATION
"Anesthesia Post Evaluation    Patient: Alanna Angelo    Procedure(s) Performed: Procedure(s) (LRB):  ESOPHAGOGASTRODUODENOSCOPY (EGD) (N/A)  COLONOSCOPY (N/A)    Final Anesthesia Type: general  Patient location during evaluation: PACU  Patient participation: Yes- Able to Participate  Level of consciousness: awake and alert and oriented  Post-procedure vital signs: reviewed and stable  Pain management: adequate  Airway patency: patent  PONV status at discharge: No PONV  Anesthetic complications: no      Cardiovascular status: stable  Respiratory status: unassisted, spontaneous ventilation and room air  Hydration status: euvolemic  Follow-up not needed.        Visit Vitals  BP (!) 114/56 (BP Location: Left arm, Patient Position: Lying)   Pulse (!) 57   Temp 36.5 °C (97.7 °F) (Temporal)   Resp 18   Ht 5' 4" (1.626 m)   Wt 64.9 kg (143 lb)   SpO2 100%   Breastfeeding? No   BMI 24.55 kg/m²       Pain/Derick Score: Pain Assessment Performed: Yes (6/11/2018  8:16 AM)  Presence of Pain: non-verbal indicators present (6/11/2018  8:16 AM)  Derick Score: 10 (6/11/2018  8:16 AM)      "

## 2018-06-11 NOTE — PROVATION PATIENT INSTRUCTIONS
Discharge Summary/Instructions after an Endoscopic Procedure  Patient Name: Alanna Angelo  Patient MRN: [id number]  Patient YOB: 1960  Monday, June 11, 2018  Yuri Ewing MD  RESTRICTIONS:  During your procedure today, you received medications for sedation.  These   medications may affect your judgment, balance and coordination.  Therefore,   for 24 hours, you have the following restrictions:   - DO NOT drive a car, operate machinery, make legal/financial decisions,   sign important papers or drink alcohol.    ACTIVITY:  Today: no heavy lifting, straining or running due to procedural   sedation/anesthesia.  The following day: return to full activity including work.  DIET:  Eat and drink normally unless instructed otherwise.     TREATMENT FOR COMMON SIDE EFFECTS:  - Mild abdominal pain, nausea, belching, bloating or excessive gas:  rest,   eat lightly and use a heating pad.  - Sore Throat: treat with throat lozenges and/or gargle with warm salt   water.  - Because air was used during the procedure, expelling large amounts of air   from your rectum or belching is normal.  - If a bowel prep was taken, you may not have a bowel movement for 1-3 days.    This is normal.  SYMPTOMS TO WATCH FOR AND REPORT TO YOUR PHYSICIAN:  1. Abdominal pain or bloating, other than gas cramps.  2. Chest pain.  3. Back pain.  4. Signs of infection such as: chills or fever occurring within 24 hours   after the procedure.  5. Rectal bleeding, which would show as bright red, maroon, or black stools.   (A tablespoon of blood from the rectum is not serious, especially if   hemorrhoids are present.)  6. Vomiting.  7. Weakness or dizziness.  GO DIRECTLY TO THE NEAREST EMERGENCY ROOM IF YOU HAVE ANY OF THE FOLLOWING:      Difficulty breathing              Chills and/or fever over 101 F   Persistent vomiting and/or vomiting blood   Severe abdominal pain   Severe chest pain   Black, tarry stools   Bleeding- more than one tablespoon   Any other  symptom or condition that you feel may need urgent attention  Your doctor recommends these additional instructions:  If any biopsies were taken, your doctors clinic will contact you in 1 to 2   weeks with any results.  - Patient has a contact number available for emergencies.  The signs and   symptoms of potential delayed complications were discussed with the   patient.  Return to normal activities tomorrow.  Written discharge   instructions were provided to the patient.   - Discharge patient to home.   - Patient is not scheduled to restart Plavix per cardiology.  - Repeat colonoscopy in 10 years for screening purposes.   - The findings and recommendations were discussed with the designated   responsible adult.   For questions, problems or results please call your physician - Yuri Ewing MD at Work:  [phone].  [facility], EMERGENCY ROOM PHONE NUMBER: [phone]  IF A COMPLICATION OR EMERGENCY SITUATION ARISES AND YOU ARE UNABLE TO REACH   YOUR PHYSICIAN - GO DIRECTLY TO THE EMERGENCY ROOM.  Yuri Ewing MD  6/11/2018 8:08:25 AM  This report has been verified and signed electronically.  [employer]

## 2018-06-11 NOTE — TRANSFER OF CARE
"Anesthesia Transfer of Care Note    Patient: Alanna Angelo    Procedure(s) Performed: Procedure(s) (LRB):  ESOPHAGOGASTRODUODENOSCOPY (EGD) (N/A)  COLONOSCOPY (N/A)    Patient location: PACU    Anesthesia Type: general    Transport from OR: Transported from OR on room air with adequate spontaneous ventilation    Post pain: adequate analgesia    Post assessment: no apparent anesthetic complications    Post vital signs: stable    Level of consciousness: awake and alert    Nausea/Vomiting: no nausea/vomiting    Complications: none    Transfer of care protocol was followed      Last vitals:   Visit Vitals  BP (!) 142/68 (BP Location: Left arm, Patient Position: Sitting)   Pulse 66   Temp 36.6 °C (97.9 °F) (Temporal)   Resp 16   Ht 5' 4" (1.626 m)   Wt 64.9 kg (143 lb)   SpO2 97%   Breastfeeding? No   BMI 24.55 kg/m²     "

## 2018-06-11 NOTE — ANESTHESIA PREPROCEDURE EVALUATION
06/11/2018  Alanna Angelo is a 58 y.o., female.    Anesthesia Evaluation    I have reviewed the Patient Summary Reports.    I have reviewed the Nursing Notes.   I have reviewed the Medications.     Review of Systems  Anesthesia Hx:  No problems with previous Anesthesia    Social:  Former Smoker    Cardiovascular:   Exercise tolerance: good Hypertension CAD  CABG/stent     Pulmonary:   COPD, mild Pt denies copd, states pulmonary function test 2 weeks ago was within normal limits   Hepatic/GI:   GERD    Musculoskeletal:  Musculoskeletal Normal    Neurological:  Neurology Normal        Physical Exam  General:  Well nourished    Airway/Jaw/Neck:  Airway Findings: Mouth Opening: Normal Mallampati: I  TM Distance: 4 - 6 cm  Jaw/Neck Findings:  Neck ROM: Normal ROM      Dental:  Dental Findings: Upper Dentures        Mental Status:  Mental Status Findings:  Cooperative, Anxious         Anesthesia Plan  Type of Anesthesia, risks & benefits discussed:  Anesthesia Type:  general  Patient's Preference:   Intra-op Monitoring Plan: standard ASA monitors  Intra-op Monitoring Plan Comments:   Post Op Pain Control Plan:   Post Op Pain Control Plan Comments:   Induction:   IV  Beta Blocker:  Patient is on a Beta-Blocker and has received one dose within the past 24 hours (No further documentation required).       Informed Consent: Patient understands risks and agrees with Anesthesia plan.  Questions answered. Anesthesia consent signed with patient.  ASA Score: 3     Day of Surgery Review of History & Physical:    H&P update referred to the surgeon.         Ready For Surgery From Anesthesia Perspective.

## 2018-06-15 ENCOUNTER — TELEPHONE (OUTPATIENT)
Dept: INTERNAL MEDICINE | Facility: CLINIC | Age: 58
End: 2018-06-15

## 2018-06-15 NOTE — TELEPHONE ENCOUNTER
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 She will need to follow up with GI.     What Is Nguyen Esophagus?     You have Nguyen esophagus. This means that there have been changes to the lining of the esophagus near the stomach. The changes may have been caused by the acid reflux that happens with GERD (gastroesophageal reflux disease). The changed lining is not cancerous, but may increase your chances of developing cancer later on.      When you have GERD  The esophagus is the tube that carries food and liquid from the mouth to the stomach. Your lower esophageal sphincter (LES) is a one-way valve at the top of the stomach. It keeps food and stomach acid from flowing backward. If the LES is weakened, food and stomach acid flow back (reflux) into your esophagus. If this happens often, the condition is called GERD.  Changes in the lining  The stomach is kept safe from its own acid by a special lining. The esophagus isnt meant to contact stomach acid. With GERD, acid flows back into the esophagus often. This damages the esophagus. In response to the damage, new tissue forms that is not normal. This is Ngueyn esophagus. The new tissue may keep changing. This is why it is more likely to become cancer in the future.  Preventing further damage  Your healthcare provider may suggest regular tests to keep track of changes in the esophagus. This usually includes an endoscopy, when a flexible lighted scope is placed through the mouth into the esophagus. Biopsies (tissue samples) can be taken of the abnormal areas. You are usually sedated with an IV medicine for comfort. He or she may also suggest ways for you to control GERD. This includes lifestyle changes, medicine, or even surgery. This should help keep your Nguyen esophagus from getting worse.  Symptoms of GERD  Symptoms include the following:  · Heartburn  · Sour-tasting fluid backing up into your mouth  · Frequent burping or  belching  · Symptoms that get worse after you eat, bend over, or lie down  · Coughing repeatedly to clear your throat  · Hoarseness   Date Last Reviewed: 6/1/2016  © 4916-5948 [employer]. [address] [place] [address], [place] [address]. All rights reserved. This information is not intended as a substitute for professional medical care. Always follow your healthcare professional's instructions.

## 2018-06-18 ENCOUNTER — TELEPHONE (OUTPATIENT)
Dept: ENDOSCOPY | Facility: HOSPITAL | Age: 58
End: 2018-06-18

## 2018-06-18 DIAGNOSIS — R79.89 LOW VITAMIN B12 LEVEL: ICD-10-CM

## 2018-06-18 DIAGNOSIS — E55.9 VITAMIN D DEFICIENCY: Primary | ICD-10-CM

## 2018-06-18 NOTE — TELEPHONE ENCOUNTER
Pt would like to know if she should continue B12 injections at home or does she need to start oral B12. Pt only had a 2 month supply of B12 injections given to her.

## 2018-06-19 NOTE — TELEPHONE ENCOUNTER
She needs to be on Vitamin B12 1000 mcg monthly, please find out her preference, she can come here or get them at home, please pend what is needed. Need repeat B12 and D levels. Please link labs to ones set for August.

## 2018-06-20 RX ORDER — CYANOCOBALAMIN 1000 UG/ML
1000 INJECTION, SOLUTION INTRAMUSCULAR; SUBCUTANEOUS
Qty: 10 ML | Refills: 0 | Status: SHIPPED | OUTPATIENT
Start: 2018-06-20 | End: 2022-07-26 | Stop reason: SDUPTHER

## 2018-07-16 ENCOUNTER — OFFICE VISIT (OUTPATIENT)
Dept: GASTROENTEROLOGY | Facility: CLINIC | Age: 58
End: 2018-07-16
Payer: COMMERCIAL

## 2018-07-16 VITALS
BODY MASS INDEX: 30.52 KG/M2 | WEIGHT: 155.44 LBS | HEART RATE: 65 BPM | HEIGHT: 60 IN | DIASTOLIC BLOOD PRESSURE: 67 MMHG | SYSTOLIC BLOOD PRESSURE: 122 MMHG

## 2018-07-16 DIAGNOSIS — Z79.899 ENCOUNTER FOR MONITORING PROTON PUMP INHIBITOR THERAPY: ICD-10-CM

## 2018-07-16 DIAGNOSIS — E55.9 VITAMIN D DEFICIENCY: ICD-10-CM

## 2018-07-16 DIAGNOSIS — K21.9 GASTROESOPHAGEAL REFLUX DISEASE WITHOUT ESOPHAGITIS: ICD-10-CM

## 2018-07-16 DIAGNOSIS — K22.70 BARRETT'S ESOPHAGUS WITHOUT DYSPLASIA: Primary | ICD-10-CM

## 2018-07-16 DIAGNOSIS — Z51.81 ENCOUNTER FOR MONITORING PROTON PUMP INHIBITOR THERAPY: ICD-10-CM

## 2018-07-16 PROCEDURE — 3074F SYST BP LT 130 MM HG: CPT | Mod: CPTII,S$GLB,, | Performed by: PHYSICIAN ASSISTANT

## 2018-07-16 PROCEDURE — 3078F DIAST BP <80 MM HG: CPT | Mod: CPTII,S$GLB,, | Performed by: PHYSICIAN ASSISTANT

## 2018-07-16 PROCEDURE — 99999 PR PBB SHADOW E&M-EST. PATIENT-LVL IV: CPT | Mod: PBBFAC,,, | Performed by: PHYSICIAN ASSISTANT

## 2018-07-16 PROCEDURE — 3008F BODY MASS INDEX DOCD: CPT | Mod: CPTII,S$GLB,, | Performed by: PHYSICIAN ASSISTANT

## 2018-07-16 PROCEDURE — 99213 OFFICE O/P EST LOW 20 MIN: CPT | Mod: S$GLB,,, | Performed by: PHYSICIAN ASSISTANT

## 2018-07-16 NOTE — PATIENT INSTRUCTIONS
You are taking a Proton Pump Inhibitor   Potential side effects:    There is a slightly increased risk of pneumonia infection, C diff infections and decreased magnesium, vitamin D and Vitamin B- 12 levels with long term PPI (ie nexium, prilosec, prevacid, dexilant, protonix) use. Yearly Magnesium, B 12 and Vitamin D levels are recommended through your primary care provider (PCP). Routine bone mineral densities by your  PCP recommended. Contact your PCP for sings and symptoms of lung infection (fever, chills, CP, SOB, and/or productive cough) or Gastro infection (profuse, watery diarrhea with or without rectal bleeding, severe abd pain,and/or fever).      For GERD:    Take your PPI 30-45 minutes before your first protein containing meal (breakfast) every day.    Remain upright for at least 3 hours after eating.    Elevate the head of the bead about 6 inches.  Some patients place cinder blocks under the head of the bed for elevation.    Avoid foods that you have noticed make your symptoms worse.    Set a weight loss goal of 10% of your body weight. (For example, if you weigh 150 lbs, your goal should be to loose 15 lbs).    Http://www.refluxcookbook.com/  Dropping Acid The Reflux Diet Cookbook and Jose E -  Hernandez Bermeo M.D.    GERD  Worst Foods for Acid Reflux  Chocolate (milk chocolate worse than dark chocolate)  Soda (all carbonated beverages)  Alcohol (beer, liquor, wine)  Fried foods  Whyte, sausage, ribs  Cream sauce  Fatty meats (beef)  Butter, margarine, lard, shortening  Coffee, tea  Mint   High fat nuts  Hot sauces and pepper  Citrus fruit/juices      Acidic foods (pH - 1 is MORE acidic, 5 is LESS acidic)     Do not eat or drink these (lower numbers are worse)    Induction diet - For 2 weeks eat nothing below pH 5     Lemon juice 2.3  Grape cranberry juice 2.5  Stomach Acid 2.5  Gelatin Dessert 2.6  Lemon/lime 2.9/2.7  Vinegar 2.9  Gatorade 3.0  Fruits - plums, apricots, strawberries, cherries 3.0  Vitamin C  (ascorbic acid) 3.0  Iced tea, Snapple 3.1  Mustard 3.2  Soft drinks 3.3  Nectarines 3.3  Pomegranate 3.3  Applesauce 3.4  Grapefruit 3.4  Kiwi 3.4  Barbecue sauce 3.4  Caesar dressing 3.5  Thousand island dressing 3.6  Strawberries 3.5  Pineapple juice 3.5  Beer 3.5  Wine 3.5  Grape 3.6  Apples 3.6  Pineapple 3.7  Pickle 3.7  Blackberries, blueberries 3.7  Hope 3.7  Orange 3.8  Cherries 3.9  Red Bull 3.9  Tomatoes 4.2  Coffee 5.1      These are Safe foods:  Agave  Aloe Vera  Apple (only red)  Bagels  Banana (worsens reflux in 1%)  Beans - black, red, lima, lentils  Bread - whole grain, rye  Caramel  Celery  Chamomile tea  Chicken - skinless, never fried  Chicken stock or bouillon  Coffee - one cup/day with milk  Fennel  Fish  Thelma  Green vegetables (no green peppers)  Herbs  Honey  Melon  Milk - skin, soy, or Lactaid skim milk  Mushrooms  Oatmeal  Olive oil  Parsley  Pasta  Pears  Popcorn  Potatoes  Red bell peppers  Rice  Soups  Tofu  Turkey Breast  Turnip  Vegetables - no onion, tomatoes, peppers  Vinaigrette  Water - non carbonated  Whole grain breads, crackers, breakfast cereals      Best Foods for Acid Reflux  Whole grain breads  Oatmeal  Aloe Vera  Salad (no tomatoes, onions, cheese, or high fat dressing)  Banana  Melon  Fennel  Chicken and turkey (skinless, never fried)  Fish/seafood (never fried)  Celery  Parsley  Couscous and Rice    Maybe bad foods (Everyone is unique)  Tomatoes  Garlic  Onion  Nuts (macadamia nuts)  Apples (especially green)  Cucumber  Green peppers  Spicy food  Some herbal teas    GERD tips  Change what you eat:  Eat smaller meals  Eat slowly and chew thoroughly until food is almost liquid  Cut down on junk carbohydrates such as sugar and white flour  Use herbs in your cooking  Eat more raw foods (more than 10 ingredients is not a raw food)  Avoid trans fats and partially hydrogenated oils  Eat more fish and switch to grass fed beef  Switch your cooking oil to macadamia nut or olive  oil  Watch extremes of salt intake (too high or too low is bad)    If just cutting out acidic foods is not enough, change how you eat:  Large breakfast, medium lunch, light dinner  Dont mix fruit juices, sweet fruits, and refined starches with meats and heavy food  Dont wash your food down with a lot of liquid    List A Proteins - meat, poultry, cheese, eggs, fish, beans, yogurt    List B Neutral - vegetables, salads, seeds, nuts, herbs, cream, butter, olive oil    List C Starches - biscuit, breads, cake, crackers, oats, pasta, potatoes, rice, sugar/honey, sweets    A + B = ok  B + C = ok  Never mix list A and C!!    Change these habits:  Stop smoking  Eat dinner earlier (3-4 hours before lying down to sleep)  Elevate the head of your bed 6 inches (blocks under the head of the bed are better than pillows)  Exercise (but wait 2 hours after eating)  Drink more water (between meals)    Take these supplements:  Multi vitamin  Probiotic  Fish oil    Most common food allergens: milk, eggs, peanuts, tree nuts, fish, shellfish, wheat, and soy    All natural immediate relief:  Chew 2-3 soft probiotic capsules - Dr. Spicer's Probiotics 12 Plus  Chew chewable DGL licorice tablet  Chew papaya tablet with high protein meal - [employer]  Drink 2 ounces of aloe vera juice  Swedish bitters  Prelief- reduce the acid in food to keep it form burning sensitive tissue  Iberogast  [place]ery [place]m  Drink Chamomile Tea  Teaspoon of baking soda in water  Spoonful of vinegar in water      All natural ulcer healers:  Zinc carnosine - 75.5 mg with food twice a day x 8 weeks   Sony Montes - $8 for 60 pills  DGL (deglycyrrhizinated licorice) - 2 tablets before meals. Heals stomach lining   Natural Factors brand, Enzymatic therapy brand.  Aloe Vera juice  - 2 to 8 ounces a day   [place] or [place]

## 2018-07-16 NOTE — PROGRESS NOTES
[facility] Consultation Note    Reason for Consult:  The primary encounter diagnosis was Santana's esophagus without dysplasia. Diagnoses of Gastroesophageal reflux disease without esophagitis, Vitamin D deficiency, and Encounter for monitoring proton pump inhibitor therapy were also pertinent to this visit.    PCP:   Carmen Devries   [address] / [place] [id number]    Referring MD:  Carmen Devries Md  [address]  [address]  [place] [address]    HPI:  This is a 58 y.o. female here for evaluation of Santana's esophagus  2018 EGD revealed mucosa suspicious for short segment Santana's, 4cm HH, ans gastritis. Bx confirmed santana's, no dysplasia, no HP, f/u in 3yrs    Today she denies GERD taking prilosec 40mg daily.  She has been taking this for many years  Denies abdominal pain, nausea, vomiting, dysphagia, constipation, diarrhea, BRBPR, or melena.    ROS:  Constitutional: No fevers, chills, No weight loss  ENT: No allergies  CV: No chest pain  Pulm: No cough, No shortness of breath  Ophtho: No vision changes  GI: see HPI  Derm: No rash  Heme: No lymphadenopathy, No bruising  MSK: No arthritis  : No dysuria, No hematuria  Endo: No hot or cold intolerance  Neuro: No syncope, No seizure  Psych: No anxiety, No depression    Medical History:  has a past medical history of Santana's esophagus without dysplasia; Coronary artery disease; GERD (gastroesophageal reflux disease); and Hypertension.    Surgical History:  has a past surgical history that includes Breast surgery; Hemorrhoid surgery;  section; Breast biopsy; Coronary angioplasty with stent (); Esophagogastroduodenoscopy (N/A, 2018); Colonoscopy (N/A, 2018); and Upper gastrointestinal endoscopy.    Family History: family history includes Cancer in her father; Heart disease in her brother and father; Hyperlipidemia in her brother and father; Hypertension in her brother and father; Rheum arthritis in  her mother; Stroke in her father; Tuberculosis in her father..     Social History:  reports that she quit smoking about 2 months ago. Her smoking use included Cigarettes. She started smoking about 43 years ago. She has a 40.00 pack-year smoking history. She has never used smokeless tobacco. She reports that she does not drink alcohol or use drugs.    Review of patient's allergies indicates:  No Known Allergies    Current Outpatient Prescriptions on File Prior to Visit   Medication Sig Dispense Refill    aspirin (ECOTRIN) 81 MG EC tablet Take 81 mg by mouth once daily.      atorvastatin (LIPITOR) 40 MG tablet Take 1 tablet (40 mg total) by mouth once daily. 90 tablet 3    cholecalciferol, vitamin D3, (VITAMIN D3) 2,000 unit Cap Take 1 capsule by mouth once daily.      cyanocobalamin 1,000 mcg/mL injection Inject 1 mL (1,000 mcg total) into the muscle every 28 days. Please dispense 21 g x 1 inch needles with the script. 10 mL 0    cyanocobalamin, vitamin B-12, 1,000 mcg/mL Kit Inject 1,000 mcg as directed every 28 days. 1 kit 1    losartan (COZAAR) 100 MG tablet Take 100 mg by mouth once daily.       metoprolol succinate (TOPROL-XL) 50 MG 24 hr tablet Take 1 tablet (50 mg total) by mouth once daily. 90 tablet 1    omega-3 fatty acids-fish oil (FISH OIL) 360-1,200 mg CpDR Take 2 capsules by mouth once daily.      omeprazole (PRILOSEC) 40 MG capsule Take 1 capsule (40 mg total) by mouth once daily. 90 capsule 0    simethicone (GAS-X EXTRA STRENGTH) 125 mg Cap capsule Take 125 mg by mouth 4 (four) times daily as needed for Flatulence.      nitroGLYCERIN (NITROSTAT) 0.4 MG SL tablet Place 0.4 mg under the tongue every 5 (five) minutes as needed for Chest pain.      [DISCONTINUED] clopidogrel (PLAVIX) 75 mg tablet Take 75 mg by mouth once daily.       No current facility-administered medications on file prior to visit.          Objective Findings:    Vital Signs:  /67   Pulse 65   Ht 5' (1.524 m)   Wt  70.5 kg (155 lb 6.8 oz)   BMI 30.35 kg/m²   Body mass index is 30.35 kg/m².    Physical Exam:  General Appearance: Well appearing in no acute distress  Head:   Normocephalic, without obvious abnormality  Eyes:    No scleral icterus  ENT: Neck supple, Lips, mucosa, and tongue normal  Lungs: CTA bilaterally in anterior and posterior fields, no wheezes, no crackles.  Heart:  Regular rate and rhythm, S1, S2 normal, no murmurs heard  Abdomen: Soft, non tender, non distended with positive bowel sounds in all four quadrants.  Extremities: no edema  Skin: No rash  Neurologic: AAO x 3      Labs:  Lab Results   Component Value Date    WBC 7.29 05/02/2018    HGB 13.7 05/02/2018    HCT 42.1 05/02/2018     05/02/2018    CHOL 162 05/02/2018    TRIG 72 05/02/2018    HDL 49 05/02/2018    ALT 11 05/02/2018    AST 20 05/02/2018     05/02/2018    K 4.4 05/02/2018     05/02/2018    CREATININE 0.9 05/02/2018    BUN 14 05/02/2018    CO2 28 05/02/2018    TSH 1.288 05/02/2018    HGBA1C 4.9 05/02/2018       Imaging:    Endoscopy:    6/2018 colonoscopy - diverticulosis, f/u in 10yrs    6/2018 EGD revealed mucosa suspicious for short segment Santana's, 4cm HH, ans gastritis. Bx confirmed santana's, no dysplasia, no HP, f/u in 3yrs    Assessment:  1. Santana's esophagus without dysplasia    2. Gastroesophageal reflux disease without esophagitis    3. Vitamin D deficiency    4. Encounter for monitoring proton pump inhibitor therapy       57yo F with long Hx of GERD, recently diagnosed with santana's esophagus. GERD is controlled on prilosec 40mg.    Recommendations:  1. continue prilosec 40mg daily. GERD diet    2. Bone density scan    PPI labs are UTD      Try weening to 20mg at next visit    Follow-up in about 1 year (around 7/16/2019).      Order summary:  Orders Placed This Encounter    DXA Bone Density Spine And Hip         Thank you so much for allowing me to participate in the care of Alanna Guaman  Gi Jimenez PA-C

## 2018-07-16 NOTE — LETTER
July 16, 2018      Carmen Devries MD  [address] [place] [address]  [address]  [place] [address]           [facility]  [address]  [place] [address]  Phone: [phone]  Fax: [phone]          Patient: Alanna Angelo   MR Number: [id number]   YOB: 1960   Date of Visit: 7/16/2018       Dear Dr. Carmen Devries:    Thank you for referring Alanna Angelo to me for evaluation. Attached you will find relevant portions of my assessment and plan of care.    If you have questions, please do not hesitate to call me. I look forward to following Alanna Angelo along with you.    Sincerely,    Kings Jimenez PA-C    Enclosure  CC:  No Recipients    If you would like to receive this communication electronically, please contact [email or web address] or [phone] to request more information on [employer] Link access.    For providers and/or their staff who would like to refer a patient to [facility], please contact us through our one-stop-shop provider referral line, [facility], at [phone].    If you feel you have received this communication in error or would no longer like to receive these types of communications, please e-mail [email or web address]

## 2018-07-24 ENCOUNTER — HOSPITAL ENCOUNTER (OUTPATIENT)
Dept: RADIOLOGY | Facility: CLINIC | Age: 58
Discharge: HOME | End: 2018-07-24
Attending: PHYSICIAN ASSISTANT
Payer: COMMERCIAL

## 2018-07-24 DIAGNOSIS — Z79.899 ENCOUNTER FOR MONITORING PROTON PUMP INHIBITOR THERAPY: ICD-10-CM

## 2018-07-24 DIAGNOSIS — E55.9 VITAMIN D DEFICIENCY: ICD-10-CM

## 2018-07-24 DIAGNOSIS — Z51.81 ENCOUNTER FOR MONITORING PROTON PUMP INHIBITOR THERAPY: ICD-10-CM

## 2018-07-24 PROCEDURE — 77080 DXA BONE DENSITY AXIAL: CPT | Mod: 26,,, | Performed by: INTERNAL MEDICINE

## 2018-07-24 PROCEDURE — 77080 DXA BONE DENSITY AXIAL: CPT | Mod: TC

## 2018-07-30 DIAGNOSIS — Z51.81 ENCOUNTER FOR MONITORING LONG-TERM PROTON PUMP INHIBITOR THERAPY: ICD-10-CM

## 2018-07-30 DIAGNOSIS — K21.9 GASTROESOPHAGEAL REFLUX DISEASE WITHOUT ESOPHAGITIS: ICD-10-CM

## 2018-07-30 DIAGNOSIS — Z79.899 ENCOUNTER FOR MONITORING LONG-TERM PROTON PUMP INHIBITOR THERAPY: ICD-10-CM

## 2018-07-30 RX ORDER — OMEPRAZOLE 40 MG/1
40 CAPSULE, DELAYED RELEASE ORAL DAILY
Qty: 90 CAPSULE | Refills: 1 | Status: SHIPPED | OUTPATIENT
Start: 2018-07-30 | End: 2019-02-05 | Stop reason: SDUPTHER

## 2018-08-01 ENCOUNTER — TELEPHONE (OUTPATIENT)
Dept: GASTROENTEROLOGY | Facility: CLINIC | Age: 58
End: 2018-08-01

## 2018-08-13 ENCOUNTER — LAB VISIT (OUTPATIENT)
Dept: LAB | Facility: HOSPITAL | Age: 58
End: 2018-08-13
Attending: INTERNAL MEDICINE
Payer: COMMERCIAL

## 2018-08-13 DIAGNOSIS — Z11.59 NEED FOR HEPATITIS C SCREENING TEST: ICD-10-CM

## 2018-08-13 DIAGNOSIS — Z87.891 FORMER SMOKER: ICD-10-CM

## 2018-08-13 DIAGNOSIS — I25.10 CORONARY ARTERY DISEASE INVOLVING NATIVE CORONARY ARTERY OF NATIVE HEART WITHOUT ANGINA PECTORIS: ICD-10-CM

## 2018-08-13 DIAGNOSIS — E55.9 VITAMIN D DEFICIENCY: ICD-10-CM

## 2018-08-13 DIAGNOSIS — I10 ESSENTIAL HYPERTENSION: ICD-10-CM

## 2018-08-13 DIAGNOSIS — R79.89 LOW VITAMIN B12 LEVEL: ICD-10-CM

## 2018-08-13 LAB
25(OH)D3+25(OH)D2 SERPL-MCNC: 42 NG/ML
ALBUMIN SERPL BCP-MCNC: 3.8 G/DL
ALP SERPL-CCNC: 98 U/L
ALT SERPL W/O P-5'-P-CCNC: 14 U/L
ANION GAP SERPL CALC-SCNC: 7 MMOL/L
AST SERPL-CCNC: 18 U/L
BILIRUB SERPL-MCNC: 0.9 MG/DL
BUN SERPL-MCNC: 15 MG/DL
CALCIUM SERPL-MCNC: 9 MG/DL
CHLORIDE SERPL-SCNC: 103 MMOL/L
CHOLEST SERPL-MCNC: 140 MG/DL
CHOLEST/HDLC SERPL: 2.9 {RATIO}
CO2 SERPL-SCNC: 29 MMOL/L
CREAT SERPL-MCNC: 0.8 MG/DL
EST. GFR  (AFRICAN AMERICAN): >60 ML/MIN/1.73 M^2
EST. GFR  (NON AFRICAN AMERICAN): >60 ML/MIN/1.73 M^2
GLUCOSE SERPL-MCNC: 90 MG/DL
HCV AB SERPL QL IA: NEGATIVE
HDLC SERPL-MCNC: 49 MG/DL
HDLC SERPL: 35 %
LDLC SERPL CALC-MCNC: 72.4 MG/DL
NONHDLC SERPL-MCNC: 91 MG/DL
POTASSIUM SERPL-SCNC: 3.9 MMOL/L
PROT SERPL-MCNC: 7.2 G/DL
SODIUM SERPL-SCNC: 139 MMOL/L
TRIGL SERPL-MCNC: 93 MG/DL
VIT B12 SERPL-MCNC: 312 PG/ML

## 2018-08-13 PROCEDURE — 82607 VITAMIN B-12: CPT

## 2018-08-13 PROCEDURE — 82306 VITAMIN D 25 HYDROXY: CPT

## 2018-08-13 PROCEDURE — 36415 COLL VENOUS BLD VENIPUNCTURE: CPT | Mod: PO

## 2018-08-13 PROCEDURE — 80053 COMPREHEN METABOLIC PANEL: CPT

## 2018-08-13 PROCEDURE — 86803 HEPATITIS C AB TEST: CPT

## 2018-08-13 PROCEDURE — 80061 LIPID PANEL: CPT

## 2018-08-15 ENCOUNTER — TELEPHONE (OUTPATIENT)
Dept: CARDIOLOGY | Facility: CLINIC | Age: 58
End: 2018-08-15

## 2018-08-15 NOTE — TELEPHONE ENCOUNTER
----- Message from Qing Wakefield MD sent at 8/15/2018  1:51 PM CDT -----  Cholesterol levels are at goal. Other blood work is normal.

## 2018-08-16 ENCOUNTER — TELEPHONE (OUTPATIENT)
Dept: INTERNAL MEDICINE | Facility: CLINIC | Age: 58
End: 2018-08-16

## 2018-08-16 NOTE — TELEPHONE ENCOUNTER
Informed pt that her blood work looks good and that her B12 is still a little low. Pt states that she is late taking this months B12 and will get the shot today. Pt has no further questions or concerns.

## 2018-08-16 NOTE — TELEPHONE ENCOUNTER
Please let her know that her blood work looks good overall.  Her vitamin B12 is still a little low.  Has she been doing her monthly B12 injections?

## 2018-08-28 RX ORDER — LOSARTAN POTASSIUM 100 MG/1
100 TABLET ORAL DAILY
Qty: 90 TABLET | Refills: 3 | Status: SHIPPED | OUTPATIENT
Start: 2018-08-28 | End: 2019-06-13 | Stop reason: SDUPTHER

## 2018-09-11 ENCOUNTER — TELEPHONE (OUTPATIENT)
Dept: INTERNAL MEDICINE | Facility: CLINIC | Age: 58
End: 2018-09-11

## 2018-09-11 DIAGNOSIS — R31.9 HEMATURIA, UNSPECIFIED TYPE: Primary | ICD-10-CM

## 2018-09-11 DIAGNOSIS — R35.0 FREQUENT URINATION: ICD-10-CM

## 2018-09-11 NOTE — TELEPHONE ENCOUNTER
Pt is requesting UA and culture for possible UTI. Pt states that she is frequently urinating and sees blood when urinating. Pt states that she feels like she has to urinate frequently.

## 2018-09-13 ENCOUNTER — LAB VISIT (OUTPATIENT)
Dept: LAB | Facility: OTHER | Age: 58
End: 2018-09-13
Attending: FAMILY MEDICINE
Payer: COMMERCIAL

## 2018-09-13 DIAGNOSIS — R35.0 FREQUENT URINATION: ICD-10-CM

## 2018-09-13 DIAGNOSIS — R31.9 HEMATURIA, UNSPECIFIED TYPE: ICD-10-CM

## 2018-09-13 LAB
BACTERIA #/AREA URNS HPF: ABNORMAL /HPF
BILIRUB UR QL STRIP: NEGATIVE
CLARITY UR: ABNORMAL
COLOR UR: YELLOW
GLUCOSE UR QL STRIP: NEGATIVE
HGB UR QL STRIP: ABNORMAL
HYALINE CASTS #/AREA URNS LPF: 0 /LPF
KETONES UR QL STRIP: NEGATIVE
LEUKOCYTE ESTERASE UR QL STRIP: ABNORMAL
MICROSCOPIC COMMENT: ABNORMAL
NITRITE UR QL STRIP: POSITIVE
PH UR STRIP: 7 [PH] (ref 5–8)
PROT UR QL STRIP: ABNORMAL
RBC #/AREA URNS HPF: 30 /HPF (ref 0–4)
SP GR UR STRIP: 1.02 (ref 1–1.03)
URN SPEC COLLECT METH UR: ABNORMAL
UROBILINOGEN UR STRIP-ACNC: 1 EU/DL
WBC #/AREA URNS HPF: 7 /HPF (ref 0–5)

## 2018-09-13 PROCEDURE — 87086 URINE CULTURE/COLONY COUNT: CPT

## 2018-09-13 PROCEDURE — 87088 URINE BACTERIA CULTURE: CPT

## 2018-09-13 PROCEDURE — 87077 CULTURE AEROBIC IDENTIFY: CPT

## 2018-09-13 PROCEDURE — 81000 URINALYSIS NONAUTO W/SCOPE: CPT

## 2018-09-13 PROCEDURE — 87186 SC STD MICRODIL/AGAR DIL: CPT

## 2018-09-17 ENCOUNTER — TELEPHONE (OUTPATIENT)
Dept: INTERNAL MEDICINE | Facility: CLINIC | Age: 58
End: 2018-09-17

## 2018-09-17 DIAGNOSIS — N30.01 ACUTE CYSTITIS WITH HEMATURIA: Primary | ICD-10-CM

## 2018-09-17 LAB — BACTERIA UR CULT: NORMAL

## 2018-09-17 RX ORDER — SULFAMETHOXAZOLE AND TRIMETHOPRIM 800; 160 MG/1; MG/1
1 TABLET ORAL 2 TIMES DAILY
Qty: 14 TABLET | Refills: 0 | Status: SHIPPED | OUTPATIENT
Start: 2018-09-17 | End: 2019-06-13

## 2018-11-07 DIAGNOSIS — I25.10 CORONARY ARTERY DISEASE INVOLVING NATIVE HEART WITHOUT ANGINA PECTORIS, UNSPECIFIED VESSEL OR LESION TYPE: ICD-10-CM

## 2018-11-07 DIAGNOSIS — I10 ESSENTIAL HYPERTENSION: ICD-10-CM

## 2018-11-08 RX ORDER — METOPROLOL SUCCINATE 50 MG/1
50 TABLET, EXTENDED RELEASE ORAL DAILY
Qty: 90 TABLET | Refills: 1 | Status: SHIPPED | OUTPATIENT
Start: 2018-11-08 | End: 2019-05-05 | Stop reason: SDUPTHER

## 2019-02-05 DIAGNOSIS — Z51.81 ENCOUNTER FOR MONITORING LONG-TERM PROTON PUMP INHIBITOR THERAPY: ICD-10-CM

## 2019-02-05 DIAGNOSIS — Z79.899 ENCOUNTER FOR MONITORING LONG-TERM PROTON PUMP INHIBITOR THERAPY: ICD-10-CM

## 2019-02-05 DIAGNOSIS — K21.9 GASTROESOPHAGEAL REFLUX DISEASE WITHOUT ESOPHAGITIS: ICD-10-CM

## 2019-02-05 RX ORDER — OMEPRAZOLE 40 MG/1
40 CAPSULE, DELAYED RELEASE ORAL DAILY
Qty: 90 CAPSULE | Refills: 1 | Status: SHIPPED | OUTPATIENT
Start: 2019-02-05 | End: 2019-08-05 | Stop reason: SDUPTHER

## 2019-05-05 DIAGNOSIS — Z12.31 SCREENING MAMMOGRAM, ENCOUNTER FOR: ICD-10-CM

## 2019-05-05 DIAGNOSIS — R79.89 LOW VITAMIN B12 LEVEL: ICD-10-CM

## 2019-05-05 DIAGNOSIS — I10 ESSENTIAL HYPERTENSION: ICD-10-CM

## 2019-05-05 DIAGNOSIS — Z13.6 ENCOUNTER FOR LIPID SCREENING FOR CARDIOVASCULAR DISEASE: ICD-10-CM

## 2019-05-05 DIAGNOSIS — Z80.0 FAMILY HISTORY OF ESOPHAGEAL CANCER: ICD-10-CM

## 2019-05-05 DIAGNOSIS — E55.9 VITAMIN D DEFICIENCY: ICD-10-CM

## 2019-05-05 DIAGNOSIS — Z13.220 ENCOUNTER FOR LIPID SCREENING FOR CARDIOVASCULAR DISEASE: ICD-10-CM

## 2019-05-05 DIAGNOSIS — I25.10 CORONARY ARTERY DISEASE INVOLVING NATIVE HEART WITHOUT ANGINA PECTORIS, UNSPECIFIED VESSEL OR LESION TYPE: ICD-10-CM

## 2019-05-05 DIAGNOSIS — Z00.00 ANNUAL PHYSICAL EXAM: ICD-10-CM

## 2019-05-05 DIAGNOSIS — K22.70 BARRETT'S ESOPHAGUS WITHOUT DYSPLASIA: Primary | ICD-10-CM

## 2019-05-06 RX ORDER — METOPROLOL SUCCINATE 50 MG/1
TABLET, EXTENDED RELEASE ORAL
Qty: 30 TABLET | Refills: 0 | Status: SHIPPED | OUTPATIENT
Start: 2019-05-06 | End: 2019-06-05 | Stop reason: SDUPTHER

## 2019-05-06 NOTE — TELEPHONE ENCOUNTER
I sent in 1 month of her medication.  Please set her up for annual exam with labs to be done prior.  Please see if we can get her labs done within the next week.  She is due for follow-up with Cardiology and pulmonology as well.  She has a CT scan that was ordered by Pulmonary last year please set this up prior her to Pulmonary visit.  She is also due for mammogram and follow up with OBGYN please see if she would be willing to see 1 of the gynecologists in our clinic the same day she is seeing me.  Please help with these appointments.  Please also discuss her getting her vaccines done.  It looks like she is due for shingles and Tdap.

## 2019-05-18 DIAGNOSIS — I25.10 CORONARY ARTERY DISEASE INVOLVING NATIVE CORONARY ARTERY OF NATIVE HEART WITHOUT ANGINA PECTORIS: ICD-10-CM

## 2019-05-18 DIAGNOSIS — I10 ESSENTIAL HYPERTENSION: ICD-10-CM

## 2019-05-18 DIAGNOSIS — Z87.891 FORMER SMOKER: ICD-10-CM

## 2019-05-20 RX ORDER — ATORVASTATIN CALCIUM 40 MG/1
TABLET, FILM COATED ORAL
Qty: 90 TABLET | Refills: 3 | Status: SHIPPED | OUTPATIENT
Start: 2019-05-20 | End: 2020-04-20

## 2019-05-31 ENCOUNTER — PATIENT OUTREACH (OUTPATIENT)
Dept: ADMINISTRATIVE | Facility: HOSPITAL | Age: 59
End: 2019-05-31

## 2019-05-31 NOTE — PROGRESS NOTES
Chart review completed. Pend lung ct orders to be done at clinic visit on 06/13/19.    Osmar Polo MA  Panel Care Coordinator   [facility]  [phone]

## 2019-06-05 ENCOUNTER — TELEPHONE (OUTPATIENT)
Dept: INTERNAL MEDICINE | Facility: CLINIC | Age: 59
End: 2019-06-05

## 2019-06-05 ENCOUNTER — HOSPITAL ENCOUNTER (OUTPATIENT)
Dept: RADIOLOGY | Facility: HOSPITAL | Age: 59
Discharge: HOME | End: 2019-06-05
Attending: FAMILY MEDICINE
Payer: COMMERCIAL

## 2019-06-05 VITALS — BODY MASS INDEX: 27.93 KG/M2 | WEIGHT: 143 LBS

## 2019-06-05 DIAGNOSIS — Z12.31 SCREENING MAMMOGRAM, ENCOUNTER FOR: ICD-10-CM

## 2019-06-05 DIAGNOSIS — I25.10 CORONARY ARTERY DISEASE INVOLVING NATIVE HEART WITHOUT ANGINA PECTORIS, UNSPECIFIED VESSEL OR LESION TYPE: ICD-10-CM

## 2019-06-05 DIAGNOSIS — I10 ESSENTIAL HYPERTENSION: ICD-10-CM

## 2019-06-05 PROCEDURE — 77063 MAMMO DIGITAL SCREENING BILAT WITH TOMOSYNTHESIS_CAD: ICD-10-PCS | Mod: 26,,, | Performed by: RADIOLOGY

## 2019-06-05 PROCEDURE — 77063 BREAST TOMOSYNTHESIS BI: CPT | Mod: 26,,, | Performed by: RADIOLOGY

## 2019-06-05 PROCEDURE — 77067 SCR MAMMO BI INCL CAD: CPT | Mod: 26,,, | Performed by: RADIOLOGY

## 2019-06-05 PROCEDURE — 77067 MAMMO DIGITAL SCREENING BILAT WITH TOMOSYNTHESIS_CAD: ICD-10-PCS | Mod: 26,,, | Performed by: RADIOLOGY

## 2019-06-05 PROCEDURE — 77067 SCR MAMMO BI INCL CAD: CPT | Mod: TC

## 2019-06-05 RX ORDER — METOPROLOL SUCCINATE 50 MG/1
TABLET, EXTENDED RELEASE ORAL
Qty: 30 TABLET | Refills: 0 | Status: SHIPPED | OUTPATIENT
Start: 2019-06-05 | End: 2019-06-13 | Stop reason: SDUPTHER

## 2019-06-06 NOTE — TELEPHONE ENCOUNTER
Please let her know that her blood work looks good.  Also, please let her know that her mammogram was normal.   Please offer to mail her a copy.

## 2019-06-07 NOTE — TELEPHONE ENCOUNTER
Informed pt that labs look good and mamm is normal. Pt verbalized understanding. Pt did not want a copy of her results. Pt has no further questions or concerns.

## 2019-06-13 ENCOUNTER — OFFICE VISIT (OUTPATIENT)
Dept: OBSTETRICS AND GYNECOLOGY | Facility: CLINIC | Age: 59
End: 2019-06-13
Payer: COMMERCIAL

## 2019-06-13 ENCOUNTER — TELEPHONE (OUTPATIENT)
Dept: INTERNAL MEDICINE | Facility: CLINIC | Age: 59
End: 2019-06-13

## 2019-06-13 ENCOUNTER — OFFICE VISIT (OUTPATIENT)
Dept: INTERNAL MEDICINE | Facility: CLINIC | Age: 59
End: 2019-06-13
Payer: COMMERCIAL

## 2019-06-13 VITALS
DIASTOLIC BLOOD PRESSURE: 69 MMHG | BODY MASS INDEX: 28.82 KG/M2 | HEIGHT: 60 IN | WEIGHT: 146.81 LBS | OXYGEN SATURATION: 97 % | HEART RATE: 68 BPM | SYSTOLIC BLOOD PRESSURE: 118 MMHG

## 2019-06-13 VITALS
WEIGHT: 146.81 LBS | DIASTOLIC BLOOD PRESSURE: 69 MMHG | BODY MASS INDEX: 28.82 KG/M2 | SYSTOLIC BLOOD PRESSURE: 118 MMHG | HEIGHT: 60 IN

## 2019-06-13 DIAGNOSIS — I25.10 CORONARY ARTERY DISEASE INVOLVING NATIVE HEART WITHOUT ANGINA PECTORIS, UNSPECIFIED VESSEL OR LESION TYPE: ICD-10-CM

## 2019-06-13 DIAGNOSIS — F41.8 SITUATIONAL ANXIETY: ICD-10-CM

## 2019-06-13 DIAGNOSIS — I10 ESSENTIAL HYPERTENSION: ICD-10-CM

## 2019-06-13 DIAGNOSIS — Z01.419 ENCOUNTER FOR GYNECOLOGICAL EXAMINATION WITHOUT ABNORMAL FINDING: Primary | ICD-10-CM

## 2019-06-13 DIAGNOSIS — Z00.00 ANNUAL PHYSICAL EXAM: Primary | ICD-10-CM

## 2019-06-13 DIAGNOSIS — F17.200 TOBACCO DEPENDENCE: ICD-10-CM

## 2019-06-13 DIAGNOSIS — K22.70 BARRETT'S ESOPHAGUS WITHOUT DYSPLASIA: ICD-10-CM

## 2019-06-13 DIAGNOSIS — R91.8 MULTIPLE LUNG NODULES: Primary | ICD-10-CM

## 2019-06-13 DIAGNOSIS — K21.9 GASTROESOPHAGEAL REFLUX DISEASE WITHOUT ESOPHAGITIS: ICD-10-CM

## 2019-06-13 DIAGNOSIS — Z78.0 POSTMENOPAUSAL: ICD-10-CM

## 2019-06-13 PROCEDURE — 99386 PR PREVENTIVE VISIT,NEW,40-64: ICD-10-PCS | Mod: S$GLB,,, | Performed by: OBSTETRICS & GYNECOLOGY

## 2019-06-13 PROCEDURE — 99999 PR PBB SHADOW E&M-EST. PATIENT-LVL IV: ICD-10-PCS | Mod: PBBFAC,,, | Performed by: FAMILY MEDICINE

## 2019-06-13 PROCEDURE — 99999 PR PBB SHADOW E&M-EST. PATIENT-LVL II: CPT | Mod: PBBFAC,,, | Performed by: OBSTETRICS & GYNECOLOGY

## 2019-06-13 PROCEDURE — 99386 PREV VISIT NEW AGE 40-64: CPT | Mod: S$GLB,,, | Performed by: OBSTETRICS & GYNECOLOGY

## 2019-06-13 PROCEDURE — 99396 PREV VISIT EST AGE 40-64: CPT | Mod: S$GLB,,, | Performed by: FAMILY MEDICINE

## 2019-06-13 PROCEDURE — 99999 PR PBB SHADOW E&M-EST. PATIENT-LVL II: ICD-10-PCS | Mod: PBBFAC,,, | Performed by: OBSTETRICS & GYNECOLOGY

## 2019-06-13 PROCEDURE — 88175 CYTOPATH C/V AUTO FLUID REDO: CPT

## 2019-06-13 PROCEDURE — 99999 PR PBB SHADOW E&M-EST. PATIENT-LVL IV: CPT | Mod: PBBFAC,,, | Performed by: FAMILY MEDICINE

## 2019-06-13 PROCEDURE — 99396 PR PREVENTIVE VISIT,EST,40-64: ICD-10-PCS | Mod: S$GLB,,, | Performed by: FAMILY MEDICINE

## 2019-06-13 RX ORDER — NITROGLYCERIN 0.4 MG/1
0.4 TABLET SUBLINGUAL EVERY 5 MIN PRN
Qty: 25 TABLET | Refills: 0 | Status: SHIPPED | OUTPATIENT
Start: 2019-06-13 | End: 2022-07-26 | Stop reason: SDUPTHER

## 2019-06-13 RX ORDER — BUPROPION HYDROCHLORIDE 150 MG/1
150 TABLET ORAL DAILY
Qty: 30 TABLET | Refills: 3 | Status: SHIPPED | OUTPATIENT
Start: 2019-06-13 | End: 2019-12-02

## 2019-06-13 RX ORDER — METOPROLOL SUCCINATE 50 MG/1
TABLET, EXTENDED RELEASE ORAL
Qty: 90 TABLET | Refills: 1 | Status: SHIPPED | OUTPATIENT
Start: 2019-06-13 | End: 2019-12-02 | Stop reason: SDUPTHER

## 2019-06-13 RX ORDER — LOSARTAN POTASSIUM 100 MG/1
100 TABLET ORAL DAILY
Qty: 90 TABLET | Refills: 3 | Status: SHIPPED | OUTPATIENT
Start: 2019-06-13 | End: 2020-05-04 | Stop reason: SDUPTHER

## 2019-06-13 NOTE — LETTER
June 20, 2019      Carmen Devries MD  [address] [place] [address]  [address]  [place] [address]           [place] [facility]  [address] [place] [address]  [place] [address]  Phone: [phone]  Fax: [phone]          Patient: Alanna Angelo   MR Number: [id number]   YOB: 1960   Date of Visit: 6/13/2019       Dear Dr. Carmen Devries:    Thank you for referring Alanna Angelo to me for evaluation. Attached you will find relevant portions of my assessment and plan of care.    If you have questions, please do not hesitate to call me. I look forward to following Alanna Angelo along with you.    Sincerely,    Jenna Martinez MD    Enclosure  CC:  No Recipients    If you would like to receive this communication electronically, please contact [email or web address] or [phone] to request more information on [employer] Link access.    For providers and/or their staff who would like to refer a patient to [facility], please contact us through our one-stop-shop provider referral line, [facility], at [phone].    If you feel you have received this communication in error or would no longer like to receive these types of communications, please e-mail [email or web address]

## 2019-06-13 NOTE — TELEPHONE ENCOUNTER
Dr. Devries would like to know if a CT chest needs to be done before the appt on 7/26/19. If so could you please order it or tell us what CT to order.

## 2019-06-13 NOTE — PROGRESS NOTES
Subjective:       Patient ID: Alanna Angelo is a 59 y.o. female.    Chief Complaint: Annual Exam    HPI  60 y/o female smoker, with CAD s/p PTCA 2008 on Plavix, HTN, Nguyen's esophagus, Multiple Lung nodules, Emphysema is here for annual exam.    She has increased stress lately, her  may lose his job, her grandkids will have to have surgery, she has not been sleeping well.  She denies feeling blue, has some worry, denies lack of concentration/lack of motivation/crying spells. She has trouble falling and staying asleep lately.  She is active at work but not doing any dedicated exercise.  She denies f/n/v/d/constipation/cp/sob/urinary sx.  Sleeping fair, appetite good.    HTN: Toprol XL 50 mg daily, Losartan 100 mg daily  GERD/Barretts: EGD 6/2018 repeat due 6/2021, omeprazole 40 mg daily  Emphysema/Lung nodules: following with pulmonary  GYN; hx of L breast biopsy benign 2001, mmg utd, pelvic set for today  CAD: following with cardiology, on bb, baby asa, Plavix, statin  Tobacco use: willing to quit  Colonoscopy: utd 6/2018  Eye exam due  Dental utd  Vaccines: gave scripts for     Review of Systems   Constitutional: Negative for activity change, appetite change, fatigue and fever.   Respiratory: Negative for cough and shortness of breath.    Cardiovascular: Negative for chest pain, palpitations and leg swelling.   Gastrointestinal: Negative for constipation, diarrhea, nausea and vomiting.   Genitourinary: Negative for difficulty urinating and dysuria.   Skin: Negative for rash.   Neurological: Negative for dizziness and light-headedness.   Psychiatric/Behavioral: Positive for sleep disturbance. Negative for agitation, decreased concentration and dysphoric mood. The patient is nervous/anxious.        Objective:      /69 (BP Location: Left arm, Patient Position: Sitting, BP Method: Large (Manual))   Pulse 68   Ht 5' (1.524 m)   Wt 66.6 kg (146 lb 13.2 oz)   SpO2 97%   BMI 28.68 kg/m²    Physical Exam   Constitutional: She appears well-developed and well-nourished.   HENT:   Head: Normocephalic and atraumatic.   Mouth/Throat: No oropharyngeal exudate.   Neck: Normal range of motion. Neck supple. No thyromegaly present.   Cardiovascular: Normal rate, regular rhythm and normal heart sounds.   Pulmonary/Chest: Effort normal and breath sounds normal. No respiratory distress.   Abdominal: Soft. Bowel sounds are normal. She exhibits no distension. There is no tenderness.   Musculoskeletal: She exhibits no edema.   Lymphadenopathy:     She has no cervical adenopathy.   Neurological: She is alert.   Skin: Skin is warm and dry.   Psychiatric: She has a normal mood and affect.   Nursing note and vitals reviewed.      Assessment:       1. Annual physical exam    2. Essential hypertension    3. Coronary artery disease involving native heart without angina pectoris, unspecified vessel or lesion type    4. Nguyen's esophagus without dysplasia, repeat EGD 6/2021    5. Gastroesophageal reflux disease without esophagitis    6. Tobacco dependence    7. Situational anxiety        Plan:   Alanna was seen today for annual exam.    Diagnoses and all orders for this visit:    Annual physical exam    Essential hypertension  -     losartan (COZAAR) 100 MG tablet; Take 1 tablet (100 mg total) by mouth once daily.  -     metoprolol succinate (TOPROL-XL) 50 MG 24 hr tablet; TAKE 1 TABLET(50 MG) BY MOUTH EVERY DAY  -     Ambulatory referral to Optometry    Coronary artery disease involving native heart without angina pectoris, unspecified vessel or lesion type  -     losartan (COZAAR) 100 MG tablet; Take 1 tablet (100 mg total) by mouth once daily.  -     metoprolol succinate (TOPROL-XL) 50 MG 24 hr tablet; TAKE 1 TABLET(50 MG) BY MOUTH EVERY DAY  -     nitroGLYCERIN (NITROSTAT) 0.4 MG SL tablet; Place 1 tablet (0.4 mg total) under the tongue every 5 (five) minutes as needed for Chest pain.    Nguyen's esophagus without  dysplasia, repeat EGD 6/2021    Gastroesophageal reflux disease without esophagitis    Tobacco dependence  -     buPROPion (WELLBUTRIN XL) 150 MG TB24 tablet; Take 1 tablet (150 mg total) by mouth once daily.    Situational anxiety  -     buPROPion (WELLBUTRIN XL) 150 MG TB24 tablet; Take 1 tablet (150 mg total) by mouth once daily.

## 2019-06-14 NOTE — TELEPHONE ENCOUNTER
I'm going to order a CT chest without contrast for her to complete over the next couple of weeks.  Definitely want to see the results before her next visit.    Thank you,  Selena

## 2019-06-20 NOTE — PROGRESS NOTES
CC: Well woman exam    Alanna Angelo is a 59 y.o. female  presents for a well woman exam.  She has no issues, problems, or complaints.    Past Medical History:   Diagnosis Date    Nguyen's esophagus without dysplasia     Coronary artery disease     GERD (gastroesophageal reflux disease)     Hypertension        Past Surgical History:   Procedure Laterality Date    BREAST BIOPSY Left     more than 10ys ago-B9    BREAST SURGERY      L breast biopsy benign      SECTION      COLONOSCOPY N/A 2018    Performed by Yuri [facility]wing [facility] at [facility] (4TH FLR)    CORONARY ANGIOPLASTY WITH STENT PLACEMENT      2 stents    ESOPHAGOGASTRODUODENOSCOPY (EGD) N/A 2018    Performed by [facility] at [facility] (4TH FLR)    HEMORRHOID SURGERY      UPPER GASTROINTESTINAL ENDOSCOPY         OB History    Para Term  AB Living   3 3 3         SAB TAB Ectopic Multiple Live Births                  # Outcome Date GA Lbr Wilian/2nd Weight Sex Delivery Anes PTL Lv   3 Term            2 Term            1 Term                Family History   Problem Relation Age of Onset    Rheum arthritis Mother     Stroke Father     Heart disease Father     Hypertension Father     Hyperlipidemia Father     Cancer Father         esophageal    Tuberculosis Father     Hypertension Brother     Hyperlipidemia Brother     Heart disease Brother     Diabetes Neg Hx     Asthma Neg Hx     Emphysema Neg Hx        Social History     Tobacco Use    Smoking status: Current Every Day Smoker     Packs/day: 1.00     Years: 40.00     Pack years: 40.00     Types: Cigarettes     Start date: 1975     Last attempt to quit: 2018     Years since quittin.1    Smokeless tobacco: Never Used   Substance Use Topics    Alcohol use: No    Drug use: No     Comment:  now and then, not regularly.       /69   Ht 5' (1.524 m)   Wt 66.6 kg (146 lb 13.2 oz)   BMI 28.68 kg/m²     ROS:  GENERAL: Denies  weight gain or weight loss. Feeling well overall.   SKIN: Denies rash or lesions.   HEAD: Denies head injury or headache.   NODES: Denies enlarged lymph nodes.   CHEST: Denies chest pain or shortness of breath.   CARDIOVASCULAR: Denies palpitations or left sided chest pain.   ABDOMEN: No abdominal pain, constipation, diarrhea, nausea, vomiting or rectal bleeding.   URINARY: No frequency, dysuria, hematuria, or burning on urination.  REPRODUCTIVE: See HPI.   BREASTS: The patient performs breast self-examination and denies pain, lumps, or nipple discharge.   HEMATOLOGIC: No easy bruisability or excessive bleeding.  MUSCULOSKELETAL: Denies joint pain or swelling.   NEUROLOGIC: Denies syncope or weakness.   PSYCHIATRIC: Denies depression, anxiety or mood swings.    Physical Exam:    APPEARANCE: Well nourished, well developed, in no acute distress.  AFFECT: WNL, alert and oriented x 3  SKIN: No acne or hirsutism  NECK: Neck symmetric without masses or thyromegaly  NODES: No inguinal, cervical, axillary, or femoral lymph node enlargement  CHEST: Good respiratory effect  ABDOMEN: Soft.  No tenderness or masses.  No hepatosplenomegaly.  No hernias.  BREASTS: Symmetrical, no skin changes or visible lesions.  No palpable masses, nipple discharge bilaterally.  PELVIC: Normal external genitalia without lesions.  Normal hair distribution.  Adequate perineal body, normal urethral meatus.  Vagina moist and well rugated without lesions or discharge.  Cervix pink, without lesions, discharge or tenderness.  No significant cystocele or rectocele.  Bimanual exam shows uterus to be normal size, regular, mobile and nontender.  Adnexa without masses or tenderness.    EXTREMITIES: No edema.    ASSESSMENT AND PLAN  1. Encounter for gynecological examination without abnormal finding  Liquid-based pap smear, screening   2. Postmenopausal       MMG up to date    Patient was counseled today on A.C.S. Pap guidelines and recommendations for  yearly pelvic exams, mammograms and monthly self breast exams; to see her PCP for other health maintenance.     No follow-ups on file.

## 2019-06-21 ENCOUNTER — HOSPITAL ENCOUNTER (OUTPATIENT)
Dept: RADIOLOGY | Facility: HOSPITAL | Age: 59
Discharge: HOME | End: 2019-06-21
Attending: NURSE PRACTITIONER
Payer: COMMERCIAL

## 2019-06-21 DIAGNOSIS — R91.8 MULTIPLE LUNG NODULES: ICD-10-CM

## 2019-06-21 PROCEDURE — 71250 CT THORAX DX C-: CPT | Mod: TC

## 2019-06-21 PROCEDURE — 71250 CT THORAX DX C-: CPT | Mod: 26,,, | Performed by: RADIOLOGY

## 2019-06-21 PROCEDURE — 71250 CT CHEST WITHOUT CONTRAST: ICD-10-PCS | Mod: 26,,, | Performed by: RADIOLOGY

## 2019-07-25 NOTE — PROGRESS NOTES
Subjective:   Patient ID:  Alanna Angelo is a 59 y.o. female who presents for follow-up of Essential hypertension      HPI: She has been feeling well. Her grandson recently underwent spinal surgery but is now home with her. Alanna Angelo denies any chest pain, shortness of breath, PND, orthopnea, palpitations, leg edema, or syncope.  She has not recently been checking her BP at home. She has been occasionally smoking. Her last echo was 3/16 and showed an LVEF of 60-65%.     ECG (5/15/18): Sinus bradycardia 57    Past Medical History:   Diagnosis Date    Nguyen's esophagus without dysplasia     Coronary artery disease     GERD (gastroesophageal reflux disease)     Hypertension        Past Surgical History:   Procedure Laterality Date    BREAST BIOPSY Left     more than 10ys ago-B9    BREAST SURGERY      L breast biopsy benign      SECTION      COLONOSCOPY N/A 2018    [facility]formed by [facility] at [facility] ([facility])    CORONARY ANGIOPLASTY WITH STENT PLACEMENT      2 stents cx.  of RCA    ESOPHAGOGASTRODUODENOSCOPY (EGD) N/A 2018    [facility] at [facility] (4TH FLR)    HEMORRHOID SURGERY      UPPER GASTROINTESTINAL ENDOSCOPY         Social History     Socioeconomic History    Marital status:      Spouse name: Not on file    Number of children: 3    Years of education: Not on file    Highest education level: Not on file   Occupational History    Occupation: Works at    Social Needs    Financial resource strain: Not on file    Food insecurity:     Worry: Not on file     Inability: Not on file    Transportation needs:     Medical: Not on file     Non-medical: Not on file   Tobacco Use    Smoking status: Current Some Day Smoker     Packs/day: 1.00     Years: 40.00     Pack years: 40.00     Types: Cigarettes     Start date: 1975    Smokeless tobacco: Never Used   Substance and Sexual Activity    Alcohol use: No     Drug use: No     Comment:  now and then, not regularly.    Sexual activity: Not on file   Lifestyle    Physical activity:     Days per week: Not on file     Minutes per session: Not on file    Stress: Not on file   Relationships    Social connections:     Talks on phone: Not on file     Gets together: Not on file     Attends Christianity service: Not on file     Active member of club or organization: Not on file     Attends meetings of clubs or organizations: Not on file     Relationship status: Not on file   Other Topics Concern    Not on file   Social History Narrative    Not on file       Family History   Problem Relation Age of Onset    Rheum arthritis Mother     Stroke Father     Heart disease Father     Hypertension Father     Hyperlipidemia Father     Cancer Father         esophageal    Tuberculosis Father     Hypertension Brother     Hyperlipidemia Brother     Heart disease Brother     Diabetes Neg Hx     Asthma Neg Hx     Emphysema Neg Hx        Patient's Medications   New Prescriptions    No medications on file   Previous Medications    ASPIRIN (ECOTRIN) 81 MG EC TABLET    Take 81 mg by mouth once daily.    ATORVASTATIN (LIPITOR) 40 MG TABLET    TAKE 1 TABLET(40 MG) BY MOUTH EVERY DAY    BUPROPION (WELLBUTRIN XL) 150 MG TB24 TABLET    Take 1 tablet (150 mg total) by mouth once daily.    CHOLECALCIFEROL, VITAMIN D3, (VITAMIN D3) 2,000 UNIT CAP    Take 1 capsule by mouth once daily.    CYANOCOBALAMIN 1,000 MCG/ML INJECTION    Inject 1 mL (1,000 mcg total) into the muscle every 28 days. Please dispense 21 g x 1 inch needles with the script.    LOSARTAN (COZAAR) 100 MG TABLET    Take 1 tablet (100 mg total) by mouth once daily.    METOPROLOL SUCCINATE (TOPROL-XL) 50 MG 24 HR TABLET    TAKE 1 TABLET(50 MG) BY MOUTH EVERY DAY    NITROGLYCERIN (NITROSTAT) 0.4 MG SL TABLET    Place 1 tablet (0.4 mg total) under the tongue every 5 (five) minutes as needed for Chest pain.    OMEGA-3 FATTY ACIDS-FISH  OIL (FISH OIL) 360-1,200 MG CPDR    Take 2 capsules by mouth once daily.    OMEPRAZOLE (PRILOSEC) 40 MG CAPSULE    Take 1 capsule (40 mg total) by mouth once daily.    SIMETHICONE (GAS-X EXTRA STRENGTH) 125 MG CAP CAPSULE    Take 125 mg by mouth 4 (four) times daily as needed for Flatulence.   Modified Medications    No medications on file   Discontinued Medications    No medications on file       Review of Systems   Constitution: Negative for malaise/fatigue and weight gain.   HENT: Negative for hearing loss.    Eyes: Negative for visual disturbance.   Cardiovascular: Negative for chest pain, claudication, dyspnea on exertion, leg swelling, near-syncope, orthopnea, palpitations, paroxysmal nocturnal dyspnea and syncope.   Respiratory: Negative for cough, shortness of breath, sleep disturbances due to breathing, snoring and wheezing.    Endocrine: Negative for cold intolerance, heat intolerance, polydipsia, polyphagia and polyuria.   Hematologic/Lymphatic: Negative for bleeding problem. Does not bruise/bleed easily.   Skin: Negative for rash and suspicious lesions.   Musculoskeletal: Negative for arthritis, falls, joint pain, muscle weakness and myalgias.   Gastrointestinal: Negative for abdominal pain, change in bowel habit, constipation, diarrhea, heartburn, hematochezia, melena and nausea.   Genitourinary: Negative for hematuria and nocturia.   Neurological: Negative for excessive daytime sleepiness, dizziness, headaches, light-headedness, loss of balance and weakness.   Psychiatric/Behavioral: Negative for depression. The patient is not nervous/anxious.    Allergic/Immunologic: Negative for environmental allergies.       BP (!) 142/70 (BP Location: Left arm, Patient Position: Sitting, BP Method: Large (Automatic))   Pulse 69   Ht 5' (1.524 m)   Wt 67.4 kg (148 lb 9.4 oz)   BMI 29.02 kg/m²     Objective:   Physical Exam   Constitutional: She is oriented to person, place, and time. She appears well-developed  and well-nourished.        HENT:   Head: Normocephalic and atraumatic.   Mouth/Throat: Oropharynx is clear and moist.   Eyes: Pupils are equal, round, and reactive to light. Conjunctivae and EOM are normal. No scleral icterus.   Neck: Normal range of motion. Neck supple. No hepatojugular reflux and no JVD present. No tracheal deviation present. No thyromegaly present.   Cardiovascular: Normal rate, regular rhythm, normal heart sounds and intact distal pulses. PMI is not displaced.   Pulses:       Carotid pulses are 2+ on the right side, and 2+ on the left side.       Radial pulses are 2+ on the right side, and 2+ on the left side.        Dorsalis pedis pulses are 2+ on the right side, and 2+ on the left side.        Posterior tibial pulses are 2+ on the right side, and 2+ on the left side.   Pulmonary/Chest: Effort normal and breath sounds normal.   Abdominal: Soft. Bowel sounds are normal. She exhibits no distension and no mass. There is no hepatosplenomegaly. There is no tenderness.   Musculoskeletal: She exhibits no edema or tenderness.   Lymphadenopathy:     She has no cervical adenopathy.   Neurological: She is alert and oriented to person, place, and time.   Skin: Skin is warm and dry. No rash noted. No cyanosis or erythema. Nails show no clubbing.   Psychiatric: She has a normal mood and affect. Her speech is normal and behavior is normal.       Lab Results   Component Value Date     06/05/2019    K 3.6 06/05/2019     06/05/2019    CO2 28 06/05/2019    BUN 11 06/05/2019    CREATININE 0.8 06/05/2019    GLU 86 06/05/2019    HGBA1C 5.0 06/05/2019    MG 2.0 06/05/2019    AST 17 06/05/2019    ALT 9 (L) 06/05/2019    ALBUMIN 4.0 06/05/2019    PROT 7.1 06/05/2019    BILITOT 0.9 06/05/2019    WBC 6.31 06/05/2019    HGB 12.7 06/05/2019    HCT 40.1 06/05/2019    MCV 94 06/05/2019     06/05/2019    TSH 1.178 06/05/2019    CHOL 121 06/05/2019    HDL 41 06/05/2019    LDLCALC 61.8 (L) 06/05/2019    TRIG  91 06/05/2019       Assessment:     1. Coronary artery disease involving native coronary artery of native heart without angina pectoris : She is asymptomatic with preserved LVEF. Continue asa and high intensity statin therapy. Following a heart health diet such as the Mediterranean Diet is recommended in addition to 150 minutes a week of moderate intensity exercise to lower cholesterol, maintain a healthy body weight, and improve overall cardiovascular health.   2. Essential hypertension : BP was elevated today. Previous readings in Epic at goal.  We discussed keeping a log of home blood pressures and notifying the office if it is consistently running above 130/80 mmHg. I have asked her to send me her readings via My [facility] in 1-2 weeks. Limit sodium intake to < 1500mg daily and follow the DASH diet plan.     3.      Smoking cessation encouraged.    Plan:     Alanna was seen today for essential hypertension.    Diagnoses and all orders for this visit:    Coronary artery disease involving native coronary artery of native heart without angina pectoris  -     EKG 12-lead; Future    Essential hypertension        Thank you for allowing me to participate in this patient's care. Please do not hesitate to contact me with any questions or concerns.

## 2019-07-26 ENCOUNTER — OFFICE VISIT (OUTPATIENT)
Dept: CARDIOLOGY | Facility: CLINIC | Age: 59
End: 2019-07-26
Payer: COMMERCIAL

## 2019-07-26 ENCOUNTER — HOSPITAL ENCOUNTER (OUTPATIENT)
Dept: PULMONOLOGY | Facility: CLINIC | Age: 59
Discharge: HOME | End: 2019-07-26
Payer: COMMERCIAL

## 2019-07-26 ENCOUNTER — OFFICE VISIT (OUTPATIENT)
Dept: PULMONOLOGY | Facility: CLINIC | Age: 59
End: 2019-07-26
Payer: COMMERCIAL

## 2019-07-26 VITALS
SYSTOLIC BLOOD PRESSURE: 142 MMHG | BODY MASS INDEX: 29.17 KG/M2 | DIASTOLIC BLOOD PRESSURE: 70 MMHG | HEIGHT: 60 IN | WEIGHT: 148.56 LBS | HEART RATE: 69 BPM

## 2019-07-26 VITALS
DIASTOLIC BLOOD PRESSURE: 72 MMHG | SYSTOLIC BLOOD PRESSURE: 118 MMHG | WEIGHT: 148.56 LBS | OXYGEN SATURATION: 98 % | BODY MASS INDEX: 29.17 KG/M2 | HEIGHT: 60 IN | HEART RATE: 71 BPM

## 2019-07-26 DIAGNOSIS — J43.2 CENTRILOBULAR EMPHYSEMA: ICD-10-CM

## 2019-07-26 DIAGNOSIS — R91.8 MULTIPLE LUNG NODULES: ICD-10-CM

## 2019-07-26 DIAGNOSIS — I25.10 CORONARY ARTERY DISEASE INVOLVING NATIVE CORONARY ARTERY OF NATIVE HEART WITHOUT ANGINA PECTORIS: Primary | ICD-10-CM

## 2019-07-26 DIAGNOSIS — F17.200 TOBACCO USE DISORDER: ICD-10-CM

## 2019-07-26 DIAGNOSIS — I10 ESSENTIAL HYPERTENSION: ICD-10-CM

## 2019-07-26 DIAGNOSIS — I70.0 ATHEROSCLEROSIS OF AORTA: ICD-10-CM

## 2019-07-26 DIAGNOSIS — R91.8 LUNG NODULES: Primary | ICD-10-CM

## 2019-07-26 LAB
POST FEV1 FVC: 0.73
POST FEV1: 1.6
POST FVC: 2.19
PRE FEV1 FVC: 77
PRE FEV1: 1.54
PRE FVC: 2
PREDICTED FEV1 FVC: 82
PREDICTED FEV1: 2.1
PREDICTED FVC: 2.59

## 2019-07-26 PROCEDURE — 99214 PR OFFICE/OUTPT VISIT, EST, LEVL IV, 30-39 MIN: ICD-10-PCS | Mod: 25,S$GLB,, | Performed by: NURSE PRACTITIONER

## 2019-07-26 PROCEDURE — 99214 OFFICE O/P EST MOD 30 MIN: CPT | Mod: S$GLB,,, | Performed by: INTERNAL MEDICINE

## 2019-07-26 PROCEDURE — 99999 PR PBB SHADOW E&M-EST. PATIENT-LVL III: ICD-10-PCS | Mod: PBBFAC,,, | Performed by: NURSE PRACTITIONER

## 2019-07-26 PROCEDURE — 94060 EVALUATION OF WHEEZING: CPT | Mod: S$GLB,,, | Performed by: INTERNAL MEDICINE

## 2019-07-26 PROCEDURE — 99999 PR PBB SHADOW E&M-EST. PATIENT-LVL III: ICD-10-PCS | Mod: PBBFAC,,, | Performed by: INTERNAL MEDICINE

## 2019-07-26 PROCEDURE — 99214 PR OFFICE/OUTPT VISIT, EST, LEVL IV, 30-39 MIN: ICD-10-PCS | Mod: S$GLB,,, | Performed by: INTERNAL MEDICINE

## 2019-07-26 PROCEDURE — 94729 DIFFUSING CAPACITY: CPT | Mod: S$GLB,,, | Performed by: INTERNAL MEDICINE

## 2019-07-26 PROCEDURE — 99999 PR PBB SHADOW E&M-EST. PATIENT-LVL III: CPT | Mod: PBBFAC,,, | Performed by: INTERNAL MEDICINE

## 2019-07-26 PROCEDURE — 94729 PR C02/MEMBANE DIFFUSE CAPACITY: ICD-10-PCS | Mod: S$GLB,,, | Performed by: INTERNAL MEDICINE

## 2019-07-26 PROCEDURE — 94060 PR EVAL OF BRONCHOSPASM: ICD-10-PCS | Mod: S$GLB,,, | Performed by: INTERNAL MEDICINE

## 2019-07-26 PROCEDURE — 99214 OFFICE O/P EST MOD 30 MIN: CPT | Mod: 25,S$GLB,, | Performed by: NURSE PRACTITIONER

## 2019-07-26 PROCEDURE — 99999 PR PBB SHADOW E&M-EST. PATIENT-LVL III: CPT | Mod: PBBFAC,,, | Performed by: NURSE PRACTITIONER

## 2019-07-26 NOTE — PROGRESS NOTES
Subjective:       Patient ID: Alanna Angelo is a 59 y.o. female.    Chief Complaint: shortness of breath  HPI:   Alanna Angelo is a 59 y.o. female who presents with evaluation for shortness of breath. She is a patient of Dr. Vega but is new ot me. Had summer cold, took mucinex and it is almost gone.  Has to clear her throat occasionally but its better.  Quit smoking for a while, has started smoking here and there, her granddaughter was being worked up CF.  Lots of stress at home and has smoked here and there.      Review of Systems   Constitutional: Negative for chills, weight loss, activity change, fatigue and night sweats.   HENT: Negative for postnasal drip, rhinorrhea, trouble swallowing and congestion.    Eyes: Negative for itching.   Respiratory: Positive for wheezing (during recent cold). Negative for cough, hemoptysis, sputum production, choking, chest tightness, shortness of breath, dyspnea on extertion and use of rescue inhaler.    Cardiovascular: Negative for chest pain and palpitations.   Genitourinary: Negative for difficulty urinating.   Endocrine: Negative for cold intolerance and heat intolerance.    Musculoskeletal: Negative for arthralgias.   Skin: Negative for rash.   Gastrointestinal: Negative for nausea, vomiting and acid reflux.   Neurological: Negative for dizziness and light-headedness.   Hematological: Negative for adenopathy.   Psychiatric/Behavioral: Negative for sleep disturbance.         Social History     Tobacco Use    Smoking status: Current Some Day Smoker     Packs/day: 1.00     Years: 40.00     Pack years: 40.00     Types: Cigarettes     Start date: 1/1/1975    Smokeless tobacco: Never Used   Substance Use Topics    Alcohol use: No       Review of patient's allergies indicates:  No Known Allergies  Past Medical History:   Diagnosis Date    Nguyen's esophagus without dysplasia     Coronary artery disease     GERD (gastroesophageal reflux disease)      Hypertension      Past Surgical History:   Procedure Laterality Date    BREAST BIOPSY Left     more than 10ys ago-B9    BREAST SURGERY      L breast biopsy benign      SECTION      COLONOSCOPY N/A 2018    Performed by [facility]wing MD at [facility] (4TH FLR)    CORONARY ANGIOPLASTY WITH STENT PLACEMENT      2 stents cx.  of RCA    ESOPHAGOGASTRODUODENOSCOPY (EGD) N/A 2018    Performed by Yuri Ewing MD at [facility] (4TH FLR)    HEMORRHOID SURGERY      UPPER GASTROINTESTINAL ENDOSCOPY       Current Outpatient Medications on File Prior to Visit   Medication Sig    aspirin (ECOTRIN) 81 MG EC tablet Take 81 mg by mouth once daily.    atorvastatin (LIPITOR) 40 MG tablet TAKE 1 TABLET(40 MG) BY MOUTH EVERY DAY    buPROPion (WELLBUTRIN XL) 150 MG TB24 tablet Take 1 tablet (150 mg total) by mouth once daily.    cholecalciferol, vitamin D3, (VITAMIN D3) 2,000 unit Cap Take 1 capsule by mouth once daily.    cyanocobalamin 1,000 mcg/mL injection Inject 1 mL (1,000 mcg total) into the muscle every 28 days. Please dispense 21 g x 1 inch needles with the script.    losartan (COZAAR) 100 MG tablet Take 1 tablet (100 mg total) by mouth once daily.    metoprolol succinate (TOPROL-XL) 50 MG 24 hr tablet TAKE 1 TABLET(50 MG) BY MOUTH EVERY DAY    nitroGLYCERIN (NITROSTAT) 0.4 MG SL tablet Place 1 tablet (0.4 mg total) under the tongue every 5 (five) minutes as needed for Chest pain.    omega-3 fatty acids-fish oil (FISH OIL) 360-1,200 mg CpDR Take 2 capsules by mouth once daily.    omeprazole (PRILOSEC) 40 MG capsule Take 1 capsule (40 mg total) by mouth once daily.    simethicone (GAS-X EXTRA STRENGTH) 125 mg Cap capsule Take 125 mg by mouth 4 (four) times daily as needed for Flatulence.     No current facility-administered medications on file prior to visit.        Objective:      Vitals:    19 0934   Pulse: 71     Physical Exam   Constitutional: She is oriented to person, place, and  time. She appears well-developed and well-nourished. No distress.   HENT:   Head: Normocephalic.   Neck: Normal range of motion. Neck supple.   Cardiovascular: Normal rate and regular rhythm.   Murmur heard.  Pulmonary/Chest: Normal expansion, symmetric chest wall expansion and effort normal. No respiratory distress. She has no decreased breath sounds. She has wheezes. She has no rhonchi. She has no rales.   Abdominal: Soft. She exhibits no distension. There is no hepatosplenomegaly. There is no tenderness.   Musculoskeletal: Normal range of motion. She exhibits no edema.   Lymphadenopathy:     She has no cervical adenopathy.   Neurological: She is alert and oriented to person, place, and time. Gait normal.   Skin: Skin is warm and dry. No cyanosis. Nails show no clubbing.   Vitals reviewed.    Personal Diagnostic Review    Imaging personally reviewed with patient 6/21/2019        Assessment:       Problem List Items Addressed This Visit        Pulmonary    Multiple lung nodules    Overview     Noted on CT 6/21/2019         Current Assessment & Plan     Stable in appearance. Repeat 5/2020         Centrilobular emphysema    Overview     Noted on previous CT. Currently smoking a few cigarettes daily         Current Assessment & Plan     Await PFTs.  Will contact patient to discuss if any controller medications are required.  Must stop smoking!         Relevant Orders    Spirometry with/without bronchodilator (Completed)    DLCO-Carbon Monoxide Diffusing Capacity (Completed)       Cardiac/Vascular    Atherosclerosis of aorta    Overview     Noted on CT         Current Assessment & Plan     On statin, per PCP           Other Visit Diagnoses     Lung nodules    -  Primary    Relevant Orders    CT Chest Without Contrast        Will contact patient with PFT results

## 2019-07-26 NOTE — PATIENT INSTRUCTIONS
We discussed keeping a log of home blood pressures and notifying the office if it is consistently running above 130/80 mmHg. I have asked her to send me her readings via [facility] in 1-2 weeks.    Eating Heart-Healthy Food: Using the DASH Plan    Eating for your heart doesnt have to be hard or boring. You just need to know how to make healthier choices. The DASH eating plan has been developed to help you do just that. DASH stands for Dietary Approaches to Stop Hypertension. It is a plan that has been proven to be healthier for your heart and to lower your risk for high blood pressure. It can also help lower your risk for cancer, heart disease, osteoporosis, and diabetes.  Choosing from each food group  Choose foods from each of the food groups below each day. Try to get the recommended number of servings for each food group. The serving numbers are based on a diet of 2,000 calories a day. Talk to your doctor if youre unsure about your calorie needs. Along with getting the correct servings, the DASH plan also recommends a sodium intake less than 2,300 mg per day.        Grains  Servings: 6 to 8 a day  A serving is:  · 1 slice bread  · 1 ounce dry cereal  · Half a cup cooked rice, pasta or cereal  Best choices: Whole grains and any grains high in fiber. Vegetables  Servings: 4 to 5 a day  A serving is:  · 1 cup raw leafy vegetable  · Half a cup cut-up raw or cooked vegetable  · Half a cup vegetable juice  Best choices: Fresh or frozen vegetables prepared without added salt or fat.   Fruits  Servings: 4 to 5 a day  A serving is:  · 1 medium fruit  · One-quarter cup dried fruit  · Half a cup fresh, frozen, or canned fruit  · Half a cup of 100% fruit juices  Best choices: A variety of fresh fruits of different colors. Whole fruits are a better choice than fruit juices. Low-fat or fat-free dairy  Servings: 2 to 3 a day  A serving is:  · 1 cup milk  · 1 cup yogurt  · One and a half ounces cheese  Best choices: Skim or  1% milk, low-fat or fat-free yogurt or buttermilk, and low-fat cheeses.         Lean meats, poultry, fish  Servings: 6 or fewer a day  A serving is:  · 1 ounce cooked meats, poultry, or fish  · 1 egg  Best choices: Lean poultry and fish. Trim away visible fat. Broil, grill, roast, or boil instead of frying. Remove skin from poultry before eating. Limit how much red meat you eat.  Nuts, seeds, beans  Servings: 4 to 5 a week  A serving is:  · One-third cup nuts (one and a half ounces)  · 2 tablespoons nut butter or seeds  · Half a cup cooked dry beans or legumes  Best choices: Dry roasted nuts with no salt added, lentils, kidney beans, garbanzo beans, and whole lentz beans.   Fats and oils  Servings: 2 to 3 a day  A serving is:  · 1 teaspoon vegetable oil  · 1 teaspoon soft margarine  · 1 tablespoon mayonnaise  · 2 tablespoons salad dressing  Best choices: Nut and vegetable oils (nontropical vegetable oils), such as olive and canola oil. Sweets  Servings: 5 a week or fewer  A serving is:  · 1 tablespoon sugar, maple syrup, or honey  · 1 tablespoon jam or jelly  · 1 half-ounce jelly beans (about 15)  · 1 cup lemonade  Best choices: Dried fruit can be a satisfying sweet. Choose low-fat sweets. And watch your serving sizes!      For more on the DASH eating plan, visit:  www.nhlbi.nih.gov/health/health-topics/topics/dash   Date Last Reviewed: 6/1/2016  © 8461-4361 [employer]. [address] [place] [address], [place] [address]. All rights reserved. This information is not intended as a substitute for professional medical care. Always follow your healthcare professional's instructions.

## 2019-07-26 NOTE — ASSESSMENT & PLAN NOTE
Await PFTs.  Will contact patient to discuss if any controller medications are required.  Must stop smoking!

## 2019-08-02 ENCOUNTER — TELEPHONE (OUTPATIENT)
Dept: INTERNAL MEDICINE | Facility: CLINIC | Age: 59
End: 2019-08-02

## 2019-08-05 DIAGNOSIS — K21.9 GASTROESOPHAGEAL REFLUX DISEASE WITHOUT ESOPHAGITIS: ICD-10-CM

## 2019-08-05 DIAGNOSIS — Z79.899 ENCOUNTER FOR MONITORING LONG-TERM PROTON PUMP INHIBITOR THERAPY: ICD-10-CM

## 2019-08-05 DIAGNOSIS — Z51.81 ENCOUNTER FOR MONITORING LONG-TERM PROTON PUMP INHIBITOR THERAPY: ICD-10-CM

## 2019-08-05 RX ORDER — OMEPRAZOLE 40 MG/1
CAPSULE, DELAYED RELEASE ORAL
Qty: 90 CAPSULE | Refills: 0 | Status: SHIPPED | OUTPATIENT
Start: 2019-08-05 | End: 2019-11-03 | Stop reason: SDUPTHER

## 2019-11-03 DIAGNOSIS — Z51.81 ENCOUNTER FOR MONITORING LONG-TERM PROTON PUMP INHIBITOR THERAPY: ICD-10-CM

## 2019-11-03 DIAGNOSIS — Z79.899 ENCOUNTER FOR MONITORING LONG-TERM PROTON PUMP INHIBITOR THERAPY: ICD-10-CM

## 2019-11-03 DIAGNOSIS — K21.9 GASTROESOPHAGEAL REFLUX DISEASE WITHOUT ESOPHAGITIS: ICD-10-CM

## 2019-11-04 RX ORDER — OMEPRAZOLE 40 MG/1
CAPSULE, DELAYED RELEASE ORAL
Qty: 90 CAPSULE | Refills: 0 | Status: SHIPPED | OUTPATIENT
Start: 2019-11-04 | End: 2020-01-28

## 2019-11-25 RX ORDER — LOSARTAN POTASSIUM 100 MG/1
TABLET ORAL
Qty: 90 TABLET | Refills: 0 | Status: SHIPPED | OUTPATIENT
Start: 2019-11-25 | End: 2019-12-02 | Stop reason: SDUPTHER

## 2019-12-02 ENCOUNTER — TELEPHONE (OUTPATIENT)
Dept: INTERNAL MEDICINE | Facility: CLINIC | Age: 59
End: 2019-12-02

## 2019-12-02 ENCOUNTER — OFFICE VISIT (OUTPATIENT)
Dept: INTERNAL MEDICINE | Facility: CLINIC | Age: 59
End: 2019-12-02
Payer: COMMERCIAL

## 2019-12-02 VITALS
WEIGHT: 150 LBS | OXYGEN SATURATION: 98 % | DIASTOLIC BLOOD PRESSURE: 74 MMHG | HEIGHT: 60 IN | HEART RATE: 70 BPM | TEMPERATURE: 98 F | SYSTOLIC BLOOD PRESSURE: 158 MMHG | BODY MASS INDEX: 29.45 KG/M2

## 2019-12-02 DIAGNOSIS — F17.200 TOBACCO DEPENDENCE: ICD-10-CM

## 2019-12-02 DIAGNOSIS — I25.10 CORONARY ARTERY DISEASE INVOLVING NATIVE HEART WITHOUT ANGINA PECTORIS, UNSPECIFIED VESSEL OR LESION TYPE: ICD-10-CM

## 2019-12-02 DIAGNOSIS — F41.8 SITUATIONAL ANXIETY: ICD-10-CM

## 2019-12-02 DIAGNOSIS — Z23 NEED FOR DIPHTHERIA-TETANUS-PERTUSSIS (TDAP) VACCINE: ICD-10-CM

## 2019-12-02 DIAGNOSIS — I10 ESSENTIAL HYPERTENSION: Primary | ICD-10-CM

## 2019-12-02 LAB
ALBUMIN SERPL BCP-MCNC: 4 G/DL (ref 3.5–5.2)
ALP SERPL-CCNC: 113 U/L (ref 55–135)
ALT SERPL W/O P-5'-P-CCNC: 13 U/L (ref 10–44)
ANION GAP SERPL CALC-SCNC: 9 MMOL/L (ref 8–16)
AST SERPL-CCNC: 21 U/L (ref 10–40)
BILIRUB SERPL-MCNC: 0.6 MG/DL (ref 0.1–1)
BUN SERPL-MCNC: 14 MG/DL (ref 6–20)
CALCIUM SERPL-MCNC: 8.9 MG/DL (ref 8.7–10.5)
CHLORIDE SERPL-SCNC: 104 MMOL/L (ref 95–110)
CO2 SERPL-SCNC: 28 MMOL/L (ref 23–29)
CREAT SERPL-MCNC: 0.8 MG/DL (ref 0.5–1.4)
EST. GFR  (AFRICAN AMERICAN): >60 ML/MIN/1.73 M^2
EST. GFR  (NON AFRICAN AMERICAN): >60 ML/MIN/1.73 M^2
GLUCOSE SERPL-MCNC: 92 MG/DL (ref 70–110)
POTASSIUM SERPL-SCNC: 4 MMOL/L (ref 3.5–5.1)
PROT SERPL-MCNC: 7.3 G/DL (ref 6–8.4)
SODIUM SERPL-SCNC: 141 MMOL/L (ref 136–145)

## 2019-12-02 PROCEDURE — 99214 PR OFFICE/OUTPT VISIT, EST, LEVL IV, 30-39 MIN: ICD-10-PCS | Mod: 25,S$GLB,, | Performed by: FAMILY MEDICINE

## 2019-12-02 PROCEDURE — 90471 IMMUNIZATION ADMIN: CPT | Mod: S$GLB,,, | Performed by: FAMILY MEDICINE

## 2019-12-02 PROCEDURE — 90472 TDAP VACCINE GREATER THAN OR EQUAL TO 7YO IM: ICD-10-PCS | Mod: S$GLB,,, | Performed by: FAMILY MEDICINE

## 2019-12-02 PROCEDURE — 99999 PR PBB SHADOW E&M-EST. PATIENT-LVL IV: ICD-10-PCS | Mod: PBBFAC,,, | Performed by: FAMILY MEDICINE

## 2019-12-02 PROCEDURE — 99214 OFFICE O/P EST MOD 30 MIN: CPT | Mod: 25,S$GLB,, | Performed by: FAMILY MEDICINE

## 2019-12-02 PROCEDURE — 90715 TDAP VACCINE GREATER THAN OR EQUAL TO 7YO IM: ICD-10-PCS | Mod: S$GLB,,, | Performed by: FAMILY MEDICINE

## 2019-12-02 PROCEDURE — 90472 IMMUNIZATION ADMIN EACH ADD: CPT | Mod: S$GLB,,, | Performed by: FAMILY MEDICINE

## 2019-12-02 PROCEDURE — 90686 IIV4 VACC NO PRSV 0.5 ML IM: CPT | Mod: S$GLB,,, | Performed by: FAMILY MEDICINE

## 2019-12-02 PROCEDURE — 90471 FLU VACCINE (QUAD) GREATER THAN OR EQUAL TO 3YO PRESERVATIVE FREE IM: ICD-10-PCS | Mod: S$GLB,,, | Performed by: FAMILY MEDICINE

## 2019-12-02 PROCEDURE — 90686 FLU VACCINE (QUAD) GREATER THAN OR EQUAL TO 3YO PRESERVATIVE FREE IM: ICD-10-PCS | Mod: S$GLB,,, | Performed by: FAMILY MEDICINE

## 2019-12-02 PROCEDURE — 90715 TDAP VACCINE 7 YRS/> IM: CPT | Mod: S$GLB,,, | Performed by: FAMILY MEDICINE

## 2019-12-02 PROCEDURE — 80053 COMPREHEN METABOLIC PANEL: CPT

## 2019-12-02 PROCEDURE — 99999 PR PBB SHADOW E&M-EST. PATIENT-LVL IV: CPT | Mod: PBBFAC,,, | Performed by: FAMILY MEDICINE

## 2019-12-02 RX ORDER — METOPROLOL SUCCINATE 50 MG/1
TABLET, EXTENDED RELEASE ORAL
Qty: 90 TABLET | Refills: 1 | Status: SHIPPED | OUTPATIENT
Start: 2019-12-02 | End: 2020-05-04 | Stop reason: SDUPTHER

## 2019-12-02 NOTE — PROGRESS NOTES
After obtaining consent, and per orders of Dr. Devries, injection of tdap Lot [id number] 11/13/21 given in the LD by WARREN LI. Patient tolerated well and band aid applied. Patient instructed to remain in clinic for 15 minutes afterwards, and to report any adverse reaction to me immediately.    After obtaining consent, and per orders of Dr. Devries, injection of fluarix Lot [id number] 6/30/20 given in the RD by WARREN LI. Patient tolerated well and band aid applied. Patient instructed to remain in clinic for 15 minutes afterwards, and to report any adverse reaction to me immediately.

## 2019-12-02 NOTE — PROGRESS NOTES
Subjective:       Patient ID: Alanna Angelo is a 59 y.o. female.    Chief Complaint: Follow-up and Hypertension    HPI  60 y/o female smoker, with CAD s/p PTCA 2008, HTN, Nguyen's esophagus, Multiple Lung nodules, Emphysema is here for follow up HTN.     She is smoking after work and on the weekends, she is smoking a 1/2 ppd. She reports she only tried Wellbutrin for a week but it did not seem to help, she denies any negative side effects, willing to try again and to maximize dose.  She denies f/n/v/d/constipation/cp/sob/urinary sx.  She is active at work, not doing dedicated exercise.  Sleeping fair, she has not tried melatonin, appetite good.     HTN: Toprol XL 50 mg daily, Losartan 100 mg daily  GERD/Barretts: EGD 6/2018 repeat due 6/2021, omeprazole 40 mg daily  Emphysema/Lung nodules: following with pulmonary, PFTs ok 7/2019  GYN; hx of L breast biopsy benign 2001, mmg utd, pelvic utd  CAD: following with cardiology, on bb, baby asa, statin; pt reports off Plavix since July per cardiology  Tobacco use: willing to quit  Colonoscopy: utd 6/2018  Eye exam due  Dental utd  Vaccines: flu shot today    Review of Systems   Constitutional: Negative for activity change, appetite change, fatigue and fever.   Respiratory: Negative for cough and shortness of breath.    Cardiovascular: Negative for chest pain, palpitations and leg swelling.   Gastrointestinal: Negative for constipation, diarrhea, nausea and vomiting.   Genitourinary: Negative for difficulty urinating and dysuria.   Skin: Negative for rash.   Neurological: Negative for dizziness, light-headedness and headaches.   Psychiatric/Behavioral: Negative for sleep disturbance.       Objective:      BP (!) 158/74 (BP Location: Right arm, Patient Position: Sitting, BP Method: Medium (Manual))   Pulse 70   Temp 98.1 °F (36.7 °C) (Oral)   Ht 5' (1.524 m)   Wt 68.1 kg (150 lb 0.4 oz)   SpO2 98%   BMI 29.30 kg/m²   Physical Exam   Constitutional: She  appears well-developed and well-nourished.   HENT:   Head: Normocephalic and atraumatic.   Mouth/Throat: No oropharyngeal exudate.   Neck: Normal range of motion. Neck supple. No thyromegaly present.   Cardiovascular: Normal rate and regular rhythm.   Murmur heard.  Pulmonary/Chest: Effort normal and breath sounds normal. No respiratory distress.   Musculoskeletal: She exhibits no edema.   Lymphadenopathy:     She has no cervical adenopathy.   Neurological: She is alert.   Skin: Skin is warm and dry.   Psychiatric: She has a normal mood and affect.   Nursing note and vitals reviewed.      Assessment:       1. Essential hypertension    2. Coronary artery disease involving native heart without angina pectoris, unspecified vessel or lesion type    3. Tobacco dependence    4. Situational anxiety    5. Need for diphtheria-tetanus-pertussis (Tdap) vaccine        Plan:   Alanna was seen today for follow-up and hypertension.    Diagnoses and all orders for this visit:    Essential hypertension  -     Comprehensive metabolic panel  -     metoprolol succinate (TOPROL-XL) 50 MG 24 hr tablet; TAKE 1 TABLET(50 MG) BY MOUTH EVERY DAY    Coronary artery disease involving native heart without angina pectoris, unspecified vessel or lesion type  -     metoprolol succinate (TOPROL-XL) 50 MG 24 hr tablet; TAKE 1 TABLET(50 MG) BY MOUTH EVERY DAY    Tobacco dependence    Situational anxiety    Need for diphtheria-tetanus-pertussis (Tdap) vaccine  -     (In Office Administered) Tdap Vaccine    Other orders  -     Influenza - Quadrivalent (PF)    she will add back home Wellbutrin  mg

## 2019-12-04 ENCOUNTER — TELEPHONE (OUTPATIENT)
Dept: INTERNAL MEDICINE | Facility: CLINIC | Age: 59
End: 2019-12-04

## 2019-12-04 NOTE — TELEPHONE ENCOUNTER
Please help her with the nurse visit blood pressure check.  I do not see that she made this appointment after her visit.

## 2020-01-28 DIAGNOSIS — K21.9 GASTROESOPHAGEAL REFLUX DISEASE WITHOUT ESOPHAGITIS: ICD-10-CM

## 2020-01-28 DIAGNOSIS — Z79.899 ENCOUNTER FOR MONITORING LONG-TERM PROTON PUMP INHIBITOR THERAPY: ICD-10-CM

## 2020-01-28 DIAGNOSIS — Z51.81 ENCOUNTER FOR MONITORING LONG-TERM PROTON PUMP INHIBITOR THERAPY: ICD-10-CM

## 2020-01-28 RX ORDER — OMEPRAZOLE 40 MG/1
CAPSULE, DELAYED RELEASE ORAL
Qty: 90 CAPSULE | Refills: 0 | Status: SHIPPED | OUTPATIENT
Start: 2020-01-28 | End: 2020-01-30 | Stop reason: SDUPTHER

## 2020-01-30 DIAGNOSIS — Z51.81 ENCOUNTER FOR MONITORING LONG-TERM PROTON PUMP INHIBITOR THERAPY: ICD-10-CM

## 2020-01-30 DIAGNOSIS — K21.9 GASTROESOPHAGEAL REFLUX DISEASE WITHOUT ESOPHAGITIS: ICD-10-CM

## 2020-01-30 DIAGNOSIS — Z79.899 ENCOUNTER FOR MONITORING LONG-TERM PROTON PUMP INHIBITOR THERAPY: ICD-10-CM

## 2020-01-30 RX ORDER — OMEPRAZOLE 40 MG/1
CAPSULE, DELAYED RELEASE ORAL
Qty: 90 CAPSULE | Refills: 0 | Status: SHIPPED | OUTPATIENT
Start: 2020-01-30 | End: 2020-05-04 | Stop reason: SDUPTHER

## 2020-04-19 DIAGNOSIS — Z87.891 FORMER SMOKER: ICD-10-CM

## 2020-04-19 DIAGNOSIS — I25.10 CORONARY ARTERY DISEASE INVOLVING NATIVE CORONARY ARTERY OF NATIVE HEART WITHOUT ANGINA PECTORIS: ICD-10-CM

## 2020-04-19 DIAGNOSIS — I10 ESSENTIAL HYPERTENSION: ICD-10-CM

## 2020-04-20 RX ORDER — ATORVASTATIN CALCIUM 40 MG/1
TABLET, FILM COATED ORAL
Qty: 90 TABLET | Refills: 3 | Status: SHIPPED | OUTPATIENT
Start: 2020-04-20 | End: 2021-02-24 | Stop reason: SDUPTHER

## 2020-05-04 ENCOUNTER — OFFICE VISIT (OUTPATIENT)
Dept: INTERNAL MEDICINE | Facility: CLINIC | Age: 60
End: 2020-05-04
Payer: COMMERCIAL

## 2020-05-04 DIAGNOSIS — M54.9 ACUTE LEFT-SIDED BACK PAIN, UNSPECIFIED BACK LOCATION: Primary | ICD-10-CM

## 2020-05-04 DIAGNOSIS — T14.8XXA MUSCLE STRAIN: ICD-10-CM

## 2020-05-04 DIAGNOSIS — Z72.0 TOBACCO USE: ICD-10-CM

## 2020-05-04 DIAGNOSIS — I10 ESSENTIAL HYPERTENSION: ICD-10-CM

## 2020-05-04 DIAGNOSIS — Z79.899 ENCOUNTER FOR MONITORING LONG-TERM PROTON PUMP INHIBITOR THERAPY: ICD-10-CM

## 2020-05-04 DIAGNOSIS — K21.9 GASTROESOPHAGEAL REFLUX DISEASE WITHOUT ESOPHAGITIS: ICD-10-CM

## 2020-05-04 DIAGNOSIS — I25.10 CORONARY ARTERY DISEASE INVOLVING NATIVE HEART WITHOUT ANGINA PECTORIS, UNSPECIFIED VESSEL OR LESION TYPE: ICD-10-CM

## 2020-05-04 DIAGNOSIS — Z51.81 ENCOUNTER FOR MONITORING LONG-TERM PROTON PUMP INHIBITOR THERAPY: ICD-10-CM

## 2020-05-04 PROCEDURE — 99213 OFFICE O/P EST LOW 20 MIN: CPT | Mod: 95,,, | Performed by: FAMILY MEDICINE

## 2020-05-04 PROCEDURE — 99213 PR OFFICE/OUTPT VISIT, EST, LEVL III, 20-29 MIN: ICD-10-PCS | Mod: 95,,, | Performed by: FAMILY MEDICINE

## 2020-05-04 RX ORDER — MELOXICAM 7.5 MG/1
TABLET ORAL
Qty: 30 TABLET | Refills: 0 | Status: SHIPPED | OUTPATIENT
Start: 2020-05-04 | End: 2022-07-26

## 2020-05-04 RX ORDER — METOPROLOL SUCCINATE 50 MG/1
TABLET, EXTENDED RELEASE ORAL
Qty: 90 TABLET | Refills: 1 | Status: SHIPPED | OUTPATIENT
Start: 2020-05-04 | End: 2020-05-13

## 2020-05-04 RX ORDER — TIZANIDINE 4 MG/1
4 TABLET ORAL 2 TIMES DAILY PRN
Qty: 30 TABLET | Refills: 0 | Status: SHIPPED | OUTPATIENT
Start: 2020-05-04 | End: 2020-05-14

## 2020-05-04 RX ORDER — OMEPRAZOLE 40 MG/1
CAPSULE, DELAYED RELEASE ORAL
Qty: 90 CAPSULE | Refills: 1 | Status: SHIPPED | OUTPATIENT
Start: 2020-05-04 | End: 2020-08-10

## 2020-05-04 RX ORDER — BUPROPION HYDROCHLORIDE 300 MG/1
300 TABLET ORAL DAILY
Qty: 30 TABLET | Refills: 1 | Status: SHIPPED | OUTPATIENT
Start: 2020-05-04 | End: 2020-06-29

## 2020-05-04 RX ORDER — LOSARTAN POTASSIUM 100 MG/1
100 TABLET ORAL DAILY
Qty: 90 TABLET | Refills: 3 | Status: SHIPPED | OUTPATIENT
Start: 2020-05-04 | End: 2020-08-20

## 2020-05-04 RX ORDER — BUPROPION HYDROCHLORIDE 300 MG/1
300 TABLET ORAL DAILY
COMMUNITY
End: 2020-05-04 | Stop reason: SDUPTHER

## 2020-05-04 NOTE — PROGRESS NOTES
"Subjective:       Patient ID: Alanna Angelo is a 60 y.o. female.    Chief Complaint: No chief complaint on file.    HPI  61 y/o female smoker, with CAD s/p PTCA 2008, HTN, Nguyen's esophagus, Multiple Lung nodules, Emphysema is here with hip pain.    She has pain in L buttock for 6 days. She tried to sit with her legs "Austrian style" 6 days ago, she denies fall or injury, when she changed positions and could no get comfortable, as the day progressed she had increasing L sided "hip pain" and has had pain since.  The pain is worse in the morning but gets better as the day progresses, she has stiffness in the morning, the pain is a sharp pressure does not radiate, worse with certain positions like going from sit to stand, in the mornings it 8/10 then after an hour or 2 is resolves unless she moves a certain way. She is taking Ibuprofen 800 mg daily for 3 days which helps minimally.     She is checking blood pressure every squ496-111/89, pulse 68, today after meds it was 123/61. She is still smoking less than a ppd, she does not feel the wellbutrin is helpful.  She denies f/n/v/d/constipation/cp/sob/urinary sx.  She active around the house but not doing dedicated exercise. Sleeping fair, appetite good.     HTN: Toprol XL 50 mg daily, Losartan 100 mg daily  GERD/Barretts: EGD 6/2018 repeat due 6/2021, omeprazole 40 mg daily  Emphysema/Lung nodules: following with pulmonary, PFTs ok 7/2019  GYN; hx of L breast biopsy benign 2001, mmg utd, pelvic utd  CAD: following with cardiology, on bb, baby asa, statin; pt reports off Plavix since July per cardiology  Tobacco use: willing to quit  Colonoscopy: utd 6/2018  Eye exam due  Dental utd     Review of Systems  see HPI  Objective:      There were no vitals taken for this visit.  Physical Exam   Constitutional: She is oriented to person, place, and time. She appears well-developed and well-nourished. No distress.   HENT:   Head: Normocephalic and atraumatic. "   Pulmonary/Chest: No respiratory distress.   Neurological: She is alert and oriented to person, place, and time.   Skin: She is not diaphoretic.   Psychiatric: She has a normal mood and affect.       Assessment:       1. Acute left-sided back pain, unspecified back location    2. Encounter for monitoring long-term proton pump inhibitor therapy    3. Gastroesophageal reflux disease without esophagitis    4. Essential hypertension    5. Coronary artery disease involving native heart without angina pectoris, unspecified vessel or lesion type    6. Muscle strain    7. Tobacco use        Plan:   Diagnoses and all orders for this visit:    Acute left-sided back pain, unspecified back location  -     meloxicam (MOBIC) 7.5 MG tablet; Take 1-2 tablets daily prn for pain  -     tiZANidine (ZANAFLEX) 4 MG tablet; Take 1 tablet (4 mg total) by mouth 2 (two) times daily as needed.    Encounter for monitoring long-term proton pump inhibitor therapy  -     omeprazole (PRILOSEC) 40 MG capsule; TAKE 1 CAPSULE(40 MG) BY MOUTH EVERY DAY    Gastroesophageal reflux disease without esophagitis  -     omeprazole (PRILOSEC) 40 MG capsule; TAKE 1 CAPSULE(40 MG) BY MOUTH EVERY DAY    Essential hypertension  -     metoprolol succinate (TOPROL-XL) 50 MG 24 hr tablet; TAKE 1 TABLET(50 MG) BY MOUTH EVERY DAY  -     losartan (COZAAR) 100 MG tablet; Take 1 tablet (100 mg total) by mouth once daily.    Coronary artery disease involving native heart without angina pectoris, unspecified vessel or lesion type  -     metoprolol succinate (TOPROL-XL) 50 MG 24 hr tablet; TAKE 1 TABLET(50 MG) BY MOUTH EVERY DAY  -     losartan (COZAAR) 100 MG tablet; Take 1 tablet (100 mg total) by mouth once daily.    Muscle strain    Tobacco use  -     buPROPion (WELLBUTRIN XL) 300 MG 24 hr tablet; Take 1 tablet (300 mg total) by mouth once daily.    The patient location is: [place]  The chief complaint leading to consultation is: L sided buttock/low back pain  Visit type:  audiovisual with Facetime  Total time spent with patient: 15 min  Each patient to whom he or she provides medical services by telemedicine is:  (1) informed of the relationship between the physician and patient and the respective role of any other health care provider with respect to management of the patient; and (2) notified that he or she may decline to receive medical services by telemedicine and may withdraw from such care at any time.

## 2020-05-13 DIAGNOSIS — I25.10 CORONARY ARTERY DISEASE INVOLVING NATIVE HEART WITHOUT ANGINA PECTORIS, UNSPECIFIED VESSEL OR LESION TYPE: ICD-10-CM

## 2020-05-13 DIAGNOSIS — I10 ESSENTIAL HYPERTENSION: ICD-10-CM

## 2020-05-13 RX ORDER — METOPROLOL SUCCINATE 50 MG/1
TABLET, EXTENDED RELEASE ORAL
Qty: 90 TABLET | Refills: 1 | Status: SHIPPED | OUTPATIENT
Start: 2020-05-13 | End: 2020-12-07 | Stop reason: SDUPTHER

## 2020-08-20 DIAGNOSIS — I10 ESSENTIAL HYPERTENSION: ICD-10-CM

## 2020-08-20 DIAGNOSIS — Z13.220 ENCOUNTER FOR LIPID SCREENING FOR CARDIOVASCULAR DISEASE: ICD-10-CM

## 2020-08-20 DIAGNOSIS — Z00.00 ANNUAL PHYSICAL EXAM: Primary | ICD-10-CM

## 2020-08-20 DIAGNOSIS — I25.10 CORONARY ARTERY DISEASE INVOLVING NATIVE HEART WITHOUT ANGINA PECTORIS, UNSPECIFIED VESSEL OR LESION TYPE: ICD-10-CM

## 2020-08-20 DIAGNOSIS — Z13.6 ENCOUNTER FOR LIPID SCREENING FOR CARDIOVASCULAR DISEASE: ICD-10-CM

## 2020-08-20 RX ORDER — LOSARTAN POTASSIUM 100 MG/1
TABLET ORAL
Qty: 90 TABLET | Refills: 3 | Status: SHIPPED | OUTPATIENT
Start: 2020-08-20 | End: 2021-02-24 | Stop reason: SDUPTHER

## 2020-08-20 NOTE — TELEPHONE ENCOUNTER
Please set up labs and annual, we can do a virtural htn follow up asap, please have her get labs and keep log to send in prior to visit.

## 2020-08-24 ENCOUNTER — TELEPHONE (OUTPATIENT)
Dept: INTERNAL MEDICINE | Facility: CLINIC | Age: 60
End: 2020-08-24

## 2020-12-07 DIAGNOSIS — I25.10 CORONARY ARTERY DISEASE INVOLVING NATIVE HEART WITHOUT ANGINA PECTORIS, UNSPECIFIED VESSEL OR LESION TYPE: ICD-10-CM

## 2020-12-07 DIAGNOSIS — I10 ESSENTIAL HYPERTENSION: ICD-10-CM

## 2020-12-07 RX ORDER — METOPROLOL SUCCINATE 50 MG/1
50 TABLET, EXTENDED RELEASE ORAL DAILY
Qty: 90 TABLET | Refills: 0 | Status: SHIPPED | OUTPATIENT
Start: 2020-12-07 | End: 2021-02-24 | Stop reason: SDUPTHER

## 2020-12-07 NOTE — TELEPHONE ENCOUNTER
Spoke with pt and informed her that Dr Devries sent in her refill of Metoprolol and would like her to schedule fasting labs along with a follow up either in-person or virtual and to keep a log of her blood pressure. Pt stated she got a new phone and is going to download the pt portal on to it and she will start logging her blood pressure and schedule the follow up appt.

## 2020-12-07 NOTE — TELEPHONE ENCOUNTER
Please call the patient set her up for the fasting labs that were ordered in August.  Please set her up for either a virtual or in person hypertension follow-up. Please have her keep a log of her blood pressure for week to bring to her appointment or  have on hand for the virtual appointment, also have her check her blood pressure on the day of the visit.

## 2021-01-28 DIAGNOSIS — Z12.31 OTHER SCREENING MAMMOGRAM: ICD-10-CM

## 2021-02-24 ENCOUNTER — OFFICE VISIT (OUTPATIENT)
Dept: INTERNAL MEDICINE | Facility: CLINIC | Age: 61
End: 2021-02-24
Payer: COMMERCIAL

## 2021-02-24 VITALS
WEIGHT: 145 LBS | BODY MASS INDEX: 28.32 KG/M2 | HEART RATE: 74 BPM | SYSTOLIC BLOOD PRESSURE: 127 MMHG | DIASTOLIC BLOOD PRESSURE: 83 MMHG

## 2021-02-24 DIAGNOSIS — J43.2 CENTRILOBULAR EMPHYSEMA: ICD-10-CM

## 2021-02-24 DIAGNOSIS — I10 ESSENTIAL HYPERTENSION: Primary | ICD-10-CM

## 2021-02-24 DIAGNOSIS — Z72.0 TOBACCO USE: ICD-10-CM

## 2021-02-24 DIAGNOSIS — I25.10 CORONARY ARTERY DISEASE INVOLVING NATIVE CORONARY ARTERY OF NATIVE HEART WITHOUT ANGINA PECTORIS: ICD-10-CM

## 2021-02-24 DIAGNOSIS — Z87.891 FORMER SMOKER: ICD-10-CM

## 2021-02-24 DIAGNOSIS — I70.0 ATHEROSCLEROSIS OF AORTA: ICD-10-CM

## 2021-02-24 DIAGNOSIS — K21.9 GASTROESOPHAGEAL REFLUX DISEASE WITHOUT ESOPHAGITIS: ICD-10-CM

## 2021-02-24 DIAGNOSIS — R09.82 PND (POST-NASAL DRIP): ICD-10-CM

## 2021-02-24 DIAGNOSIS — I25.10 CORONARY ARTERY DISEASE INVOLVING NATIVE HEART WITHOUT ANGINA PECTORIS, UNSPECIFIED VESSEL OR LESION TYPE: ICD-10-CM

## 2021-02-24 DIAGNOSIS — K22.70 BARRETT'S ESOPHAGUS WITHOUT DYSPLASIA: ICD-10-CM

## 2021-02-24 DIAGNOSIS — Z79.899 ENCOUNTER FOR MONITORING LONG-TERM PROTON PUMP INHIBITOR THERAPY: ICD-10-CM

## 2021-02-24 DIAGNOSIS — F41.8 SITUATIONAL ANXIETY: ICD-10-CM

## 2021-02-24 DIAGNOSIS — Z51.81 ENCOUNTER FOR MONITORING LONG-TERM PROTON PUMP INHIBITOR THERAPY: ICD-10-CM

## 2021-02-24 PROCEDURE — 99213 PR OFFICE/OUTPT VISIT, EST, LEVL III, 20-29 MIN: ICD-10-PCS | Mod: 95,,, | Performed by: FAMILY MEDICINE

## 2021-02-24 PROCEDURE — 99213 OFFICE O/P EST LOW 20 MIN: CPT | Mod: 95,,, | Performed by: FAMILY MEDICINE

## 2021-02-24 RX ORDER — BUPROPION HYDROCHLORIDE 150 MG/1
150 TABLET ORAL DAILY
Qty: 90 TABLET | Refills: 1 | Status: SHIPPED | OUTPATIENT
Start: 2021-02-24 | End: 2022-07-26 | Stop reason: SDUPTHER

## 2021-02-24 RX ORDER — MELATONIN 3 MG
1 CAPSULE ORAL NIGHTLY
COMMUNITY
End: 2022-07-26

## 2021-02-24 RX ORDER — FLUTICASONE PROPIONATE 50 MCG
1 SPRAY, SUSPENSION (ML) NASAL DAILY
Qty: 16 G | Refills: 6 | Status: SHIPPED | OUTPATIENT
Start: 2021-02-24 | End: 2024-03-26 | Stop reason: ALTCHOICE

## 2021-02-24 RX ORDER — OMEPRAZOLE 40 MG/1
40 CAPSULE, DELAYED RELEASE ORAL DAILY
Qty: 90 CAPSULE | Refills: 1 | Status: SHIPPED | OUTPATIENT
Start: 2021-02-24 | End: 2021-05-18 | Stop reason: SDUPTHER

## 2021-02-24 RX ORDER — METOPROLOL SUCCINATE 50 MG/1
50 TABLET, EXTENDED RELEASE ORAL DAILY
Qty: 90 TABLET | Refills: 1 | Status: SHIPPED | OUTPATIENT
Start: 2021-02-24 | End: 2021-03-10 | Stop reason: SDUPTHER

## 2021-02-24 RX ORDER — ATORVASTATIN CALCIUM 40 MG/1
40 TABLET, FILM COATED ORAL DAILY
Qty: 90 TABLET | Refills: 3 | Status: SHIPPED | OUTPATIENT
Start: 2021-02-24 | End: 2021-03-10 | Stop reason: SDUPTHER

## 2021-02-24 RX ORDER — LOSARTAN POTASSIUM 100 MG/1
100 TABLET ORAL DAILY
Qty: 90 TABLET | Refills: 3 | Status: SHIPPED | OUTPATIENT
Start: 2021-02-24 | End: 2021-08-28 | Stop reason: SDUPTHER

## 2021-02-25 ENCOUNTER — HOSPITAL ENCOUNTER (OUTPATIENT)
Dept: RADIOLOGY | Facility: HOSPITAL | Age: 61
Discharge: HOME | End: 2021-02-25
Attending: FAMILY MEDICINE
Payer: COMMERCIAL

## 2021-02-25 DIAGNOSIS — Z12.31 OTHER SCREENING MAMMOGRAM: ICD-10-CM

## 2021-02-25 PROCEDURE — 77067 SCR MAMMO BI INCL CAD: CPT | Mod: TC

## 2021-02-25 PROCEDURE — 77063 BREAST TOMOSYNTHESIS BI: CPT | Mod: 26,,, | Performed by: RADIOLOGY

## 2021-02-25 PROCEDURE — 77067 SCR MAMMO BI INCL CAD: CPT | Mod: 26,,, | Performed by: RADIOLOGY

## 2021-02-25 PROCEDURE — 77067 MAMMO DIGITAL SCREENING BILAT WITH TOMO: ICD-10-PCS | Mod: 26,,, | Performed by: RADIOLOGY

## 2021-02-25 PROCEDURE — 77063 MAMMO DIGITAL SCREENING BILAT WITH TOMO: ICD-10-PCS | Mod: 26,,, | Performed by: RADIOLOGY

## 2021-03-01 ENCOUNTER — TELEPHONE (OUTPATIENT)
Dept: INTERNAL MEDICINE | Facility: CLINIC | Age: 61
End: 2021-03-01

## 2021-03-01 DIAGNOSIS — Z72.0 TOBACCO USE: ICD-10-CM

## 2021-03-01 DIAGNOSIS — R31.9 HEMATURIA, UNSPECIFIED TYPE: Primary | ICD-10-CM

## 2021-03-03 ENCOUNTER — IMMUNIZATION (OUTPATIENT)
Dept: PRIMARY CARE CLINIC | Facility: CLINIC | Age: 61
End: 2021-03-03

## 2021-03-03 DIAGNOSIS — Z23 NEED FOR VACCINATION: Primary | ICD-10-CM

## 2021-03-03 PROCEDURE — 91300 PR SARS-COV- 2 COVID-19 VACCINE, NO PRSV, 30MCG/0.3ML, IM: ICD-10-PCS | Mod: S$GLB,,, | Performed by: INTERNAL MEDICINE

## 2021-03-03 PROCEDURE — 91300 PR SARS-COV- 2 COVID-19 VACCINE, NO PRSV, 30MCG/0.3ML, IM: CPT | Mod: S$GLB,,, | Performed by: INTERNAL MEDICINE

## 2021-03-03 PROCEDURE — 0001A PR IMMUNIZ ADMIN, SARS-COV-2 COVID-19 VACC, 30MCG/0.3ML, 1ST DOSE: CPT | Mod: CV19,S$GLB,, | Performed by: INTERNAL MEDICINE

## 2021-03-03 PROCEDURE — 0001A PR IMMUNIZ ADMIN, SARS-COV-2 COVID-19 VACC, 30MCG/0.3ML, 1ST DOSE: ICD-10-PCS | Mod: CV19,S$GLB,, | Performed by: INTERNAL MEDICINE

## 2021-03-03 RX ADMIN — Medication 0.3 ML: at 09:03

## 2021-03-10 DIAGNOSIS — I25.10 CORONARY ARTERY DISEASE INVOLVING NATIVE CORONARY ARTERY OF NATIVE HEART WITHOUT ANGINA PECTORIS: ICD-10-CM

## 2021-03-10 DIAGNOSIS — Z87.891 FORMER SMOKER: ICD-10-CM

## 2021-03-10 DIAGNOSIS — I10 ESSENTIAL HYPERTENSION: ICD-10-CM

## 2021-03-10 DIAGNOSIS — I25.10 CORONARY ARTERY DISEASE INVOLVING NATIVE HEART WITHOUT ANGINA PECTORIS, UNSPECIFIED VESSEL OR LESION TYPE: ICD-10-CM

## 2021-03-10 RX ORDER — ATORVASTATIN CALCIUM 40 MG/1
40 TABLET, FILM COATED ORAL DAILY
Qty: 90 TABLET | Refills: 3 | Status: SHIPPED | OUTPATIENT
Start: 2021-03-10 | End: 2022-03-15 | Stop reason: SDUPTHER

## 2021-03-10 RX ORDER — METOPROLOL SUCCINATE 50 MG/1
50 TABLET, EXTENDED RELEASE ORAL DAILY
Qty: 90 TABLET | Refills: 1 | Status: SHIPPED | OUTPATIENT
Start: 2021-03-10 | End: 2022-03-15

## 2021-03-26 ENCOUNTER — IMMUNIZATION (OUTPATIENT)
Dept: PRIMARY CARE CLINIC | Facility: CLINIC | Age: 61
End: 2021-03-26

## 2021-03-26 DIAGNOSIS — Z23 NEED FOR VACCINATION: Primary | ICD-10-CM

## 2021-03-26 PROCEDURE — 91300 PR SARS-COV- 2 COVID-19 VACCINE, NO PRSV, 30MCG/0.3ML, IM: ICD-10-PCS | Mod: S$GLB,,, | Performed by: INTERNAL MEDICINE

## 2021-03-26 PROCEDURE — 91300 PR SARS-COV- 2 COVID-19 VACCINE, NO PRSV, 30MCG/0.3ML, IM: CPT | Mod: S$GLB,,, | Performed by: INTERNAL MEDICINE

## 2021-03-26 PROCEDURE — 0002A PR IMMUNIZ ADMIN, SARS-COV-2 COVID-19 VACC, 30MCG/0.3ML, 2ND DOSE: CPT | Mod: CV19,S$GLB,, | Performed by: INTERNAL MEDICINE

## 2021-03-26 PROCEDURE — 0002A PR IMMUNIZ ADMIN, SARS-COV-2 COVID-19 VACC, 30MCG/0.3ML, 2ND DOSE: ICD-10-PCS | Mod: CV19,S$GLB,, | Performed by: INTERNAL MEDICINE

## 2021-03-26 RX ADMIN — Medication 0.3 ML: at 11:03

## 2021-05-18 DIAGNOSIS — Z51.81 ENCOUNTER FOR MONITORING LONG-TERM PROTON PUMP INHIBITOR THERAPY: ICD-10-CM

## 2021-05-18 DIAGNOSIS — Z79.899 ENCOUNTER FOR MONITORING LONG-TERM PROTON PUMP INHIBITOR THERAPY: ICD-10-CM

## 2021-05-18 DIAGNOSIS — K21.9 GASTROESOPHAGEAL REFLUX DISEASE WITHOUT ESOPHAGITIS: ICD-10-CM

## 2021-05-18 RX ORDER — OMEPRAZOLE 40 MG/1
40 CAPSULE, DELAYED RELEASE ORAL DAILY
Qty: 90 CAPSULE | Refills: 1 | Status: SHIPPED | OUTPATIENT
Start: 2021-05-18 | End: 2021-09-17 | Stop reason: SDUPTHER

## 2021-08-19 ENCOUNTER — TELEPHONE (OUTPATIENT)
Dept: INTERNAL MEDICINE | Facility: CLINIC | Age: 61
End: 2021-08-19

## 2021-08-19 DIAGNOSIS — R30.0 DYSURIA: Primary | ICD-10-CM

## 2021-08-20 ENCOUNTER — TELEPHONE (OUTPATIENT)
Dept: INTERNAL MEDICINE | Facility: CLINIC | Age: 61
End: 2021-08-20

## 2021-08-20 ENCOUNTER — LAB VISIT (OUTPATIENT)
Dept: LAB | Facility: HOSPITAL | Age: 61
End: 2021-08-20
Attending: INTERNAL MEDICINE
Payer: MEDICAID

## 2021-08-20 DIAGNOSIS — R30.0 DYSURIA: ICD-10-CM

## 2021-08-20 LAB
BACTERIA #/AREA URNS AUTO: NORMAL /HPF
BILIRUB UR QL STRIP: NEGATIVE
CLARITY UR REFRACT.AUTO: CLEAR
COLOR UR AUTO: YELLOW
GLUCOSE UR QL STRIP: NEGATIVE
HGB UR QL STRIP: NEGATIVE
KETONES UR QL STRIP: NEGATIVE
LEUKOCYTE ESTERASE UR QL STRIP: NEGATIVE
MICROSCOPIC COMMENT: NORMAL
NITRITE UR QL STRIP: NEGATIVE
PH UR STRIP: 7 [PH] (ref 5–8)
PROT UR QL STRIP: NEGATIVE
SP GR UR STRIP: 1.01 (ref 1–1.03)
URN SPEC COLLECT METH UR: NORMAL

## 2021-08-20 PROCEDURE — 81001 URINALYSIS AUTO W/SCOPE: CPT | Performed by: INTERNAL MEDICINE

## 2021-08-20 PROCEDURE — 87086 URINE CULTURE/COLONY COUNT: CPT | Performed by: INTERNAL MEDICINE

## 2021-08-21 LAB — BACTERIA UR CULT: NO GROWTH

## 2021-08-23 ENCOUNTER — PATIENT MESSAGE (OUTPATIENT)
Dept: INTERNAL MEDICINE | Facility: CLINIC | Age: 61
End: 2021-08-23

## 2021-08-28 DIAGNOSIS — I10 ESSENTIAL HYPERTENSION: ICD-10-CM

## 2021-08-28 DIAGNOSIS — I25.10 CORONARY ARTERY DISEASE INVOLVING NATIVE HEART WITHOUT ANGINA PECTORIS, UNSPECIFIED VESSEL OR LESION TYPE: ICD-10-CM

## 2021-08-30 RX ORDER — LOSARTAN POTASSIUM 100 MG/1
100 TABLET ORAL DAILY
Qty: 90 TABLET | Refills: 1 | Status: SHIPPED | OUTPATIENT
Start: 2021-08-30 | End: 2022-03-15 | Stop reason: SDUPTHER

## 2021-09-10 ENCOUNTER — TELEPHONE (OUTPATIENT)
Dept: INTERNAL MEDICINE | Facility: CLINIC | Age: 61
End: 2021-09-10

## 2021-09-17 DIAGNOSIS — Z51.81 ENCOUNTER FOR MONITORING LONG-TERM PROTON PUMP INHIBITOR THERAPY: ICD-10-CM

## 2021-09-17 DIAGNOSIS — K21.9 GASTROESOPHAGEAL REFLUX DISEASE WITHOUT ESOPHAGITIS: ICD-10-CM

## 2021-09-17 DIAGNOSIS — Z79.899 ENCOUNTER FOR MONITORING LONG-TERM PROTON PUMP INHIBITOR THERAPY: ICD-10-CM

## 2021-09-17 RX ORDER — OMEPRAZOLE 40 MG/1
40 CAPSULE, DELAYED RELEASE ORAL DAILY
Qty: 90 CAPSULE | Refills: 1 | Status: SHIPPED | OUTPATIENT
Start: 2021-09-17 | End: 2021-11-30 | Stop reason: SDUPTHER

## 2021-11-30 DIAGNOSIS — Z51.81 ENCOUNTER FOR MONITORING LONG-TERM PROTON PUMP INHIBITOR THERAPY: ICD-10-CM

## 2021-11-30 DIAGNOSIS — Z79.899 ENCOUNTER FOR MONITORING LONG-TERM PROTON PUMP INHIBITOR THERAPY: ICD-10-CM

## 2021-11-30 DIAGNOSIS — K21.9 GASTROESOPHAGEAL REFLUX DISEASE WITHOUT ESOPHAGITIS: ICD-10-CM

## 2021-11-30 RX ORDER — OMEPRAZOLE 40 MG/1
40 CAPSULE, DELAYED RELEASE ORAL DAILY
Qty: 90 CAPSULE | Refills: 1 | Status: SHIPPED | OUTPATIENT
Start: 2021-11-30 | End: 2021-12-03

## 2022-03-14 DIAGNOSIS — Z12.31 OTHER SCREENING MAMMOGRAM: ICD-10-CM

## 2022-03-15 DIAGNOSIS — I10 ESSENTIAL HYPERTENSION: ICD-10-CM

## 2022-03-15 DIAGNOSIS — J43.2 CENTRILOBULAR EMPHYSEMA: ICD-10-CM

## 2022-03-15 DIAGNOSIS — E55.9 VITAMIN D DEFICIENCY: ICD-10-CM

## 2022-03-15 DIAGNOSIS — R79.89 LOW VITAMIN B12 LEVEL: ICD-10-CM

## 2022-03-15 DIAGNOSIS — I70.0 ATHEROSCLEROSIS OF AORTA: ICD-10-CM

## 2022-03-15 DIAGNOSIS — Z51.81 ENCOUNTER FOR MONITORING LONG-TERM PROTON PUMP INHIBITOR THERAPY: ICD-10-CM

## 2022-03-15 DIAGNOSIS — Z13.220 ENCOUNTER FOR LIPID SCREENING FOR CARDIOVASCULAR DISEASE: ICD-10-CM

## 2022-03-15 DIAGNOSIS — K22.70 BARRETT'S ESOPHAGUS WITHOUT DYSPLASIA: ICD-10-CM

## 2022-03-15 DIAGNOSIS — Z79.899 ENCOUNTER FOR MONITORING LONG-TERM PROTON PUMP INHIBITOR THERAPY: ICD-10-CM

## 2022-03-15 DIAGNOSIS — I25.10 CORONARY ARTERY DISEASE INVOLVING NATIVE HEART WITHOUT ANGINA PECTORIS, UNSPECIFIED VESSEL OR LESION TYPE: ICD-10-CM

## 2022-03-15 DIAGNOSIS — Z00.00 ANNUAL PHYSICAL EXAM: Primary | ICD-10-CM

## 2022-03-15 DIAGNOSIS — K21.9 GASTROESOPHAGEAL REFLUX DISEASE WITHOUT ESOPHAGITIS: ICD-10-CM

## 2022-03-15 DIAGNOSIS — Z87.891 FORMER SMOKER: ICD-10-CM

## 2022-03-15 DIAGNOSIS — Z13.6 ENCOUNTER FOR LIPID SCREENING FOR CARDIOVASCULAR DISEASE: ICD-10-CM

## 2022-03-15 DIAGNOSIS — I25.10 CORONARY ARTERY DISEASE INVOLVING NATIVE CORONARY ARTERY OF NATIVE HEART WITHOUT ANGINA PECTORIS: ICD-10-CM

## 2022-03-15 RX ORDER — LOSARTAN POTASSIUM 100 MG/1
100 TABLET ORAL DAILY
Qty: 90 TABLET | Refills: 0 | Status: SHIPPED | OUTPATIENT
Start: 2022-03-15 | End: 2022-04-18

## 2022-03-15 RX ORDER — ATORVASTATIN CALCIUM 40 MG/1
40 TABLET, FILM COATED ORAL DAILY
Qty: 90 TABLET | Refills: 0 | Status: SHIPPED | OUTPATIENT
Start: 2022-03-15 | End: 2022-07-26 | Stop reason: SDUPTHER

## 2022-03-15 RX ORDER — OMEPRAZOLE 40 MG/1
40 CAPSULE, DELAYED RELEASE ORAL DAILY
Qty: 90 CAPSULE | Refills: 0 | Status: SHIPPED | OUTPATIENT
Start: 2022-03-15 | End: 2022-07-26 | Stop reason: SDUPTHER

## 2022-03-15 RX ORDER — METOPROLOL SUCCINATE 50 MG/1
TABLET, EXTENDED RELEASE ORAL
Qty: 90 TABLET | Refills: 0 | Status: SHIPPED | OUTPATIENT
Start: 2022-03-15 | End: 2022-06-25

## 2022-03-15 NOTE — TELEPHONE ENCOUNTER
Please set her up for hypertension follow-up as soon as possible.  In the meantime we can always get her in for a nurse visit if needed.  She is also overdue for blood work.  Please schedule her as soon as possible.

## 2022-03-15 NOTE — TELEPHONE ENCOUNTER

## 2022-03-15 NOTE — TELEPHONE ENCOUNTER
Care Due:                  Date            Visit Type   Department     Provider  --------------------------------------------------------------------------------                                ESTABLISHED   [facility]                              PATIENT -    MEDICINE/  Last Visit: 02-      [facility]      INTERNAL MED   Carmen Devries  Next Visit: None Scheduled  None         None Found                                                            Last  Test          Frequency    Reason                     Performed    Due Date  --------------------------------------------------------------------------------    Office Visit  12 months..  atorvastatin, losartan,    02- 02-                             metoprolol, omeprazole...    CMP.........  12 months..  atorvastatin, losartan...  02- 02-    Lipid Panel.  12 months..  atorvastatin.............  02- 02-    Powered by [employer]. Reference number: [id number].   3/15/2022 10:26:17 AM CDT

## 2022-03-15 NOTE — TELEPHONE ENCOUNTER
----- Message from Ирина Brock sent at 3/15/2022 12:11 PM CDT -----  Contact: pt  Requesting an RX refill or new RX.  Is this a refill or new RX:   RX name and strength (copy/paste from chart):losartan (COZAAR) 100 MG tablet    Is this a 30 day or 90 day RX:   Pharmacy name and phone # (copy/paste from chart):  [id number]  [place] [facility]  AT [address] [place] [address]  Phone: [phone] Fax: [phone]      The doctors have asked that we provide their patients with the following 2 reminders -- prescription refills can take up to 72 hours, and a friendly reminder that in the future you can use your [facility] account to request refills:           Requesting an RX refill or new RX.  Is this a refill or new RX:   RX name and strength (copy/paste from chart):omeprazole (PRILOSEC) 40 MG capsule    Is this a 30 day or 90 day RX:   Pharmacy name and phone # (copy/paste from chart):    [id number] [place] [facility]  AT [address] [place] [address]  Phone: [phone] Fax: [phone]      The doctors have asked that we provide their patients with the following 2 reminders -- prescription refills can take up to 72 hours, and a friendly reminder that in the future you can use your [facility] account to request refills:           Requesting an RX refill or new RX.  Is this a refill or new RX:   RX name and strength (copy/paste from chart):metoprolol succinate (TOPROL-XL) 50 MG 24 hr tablet    Is this a 30 day or 90 day RX:   Pharmacy name and phone # (copy/paste from chart):   [facility] #[id number]  [place] BR[address] [facility] AT [address] [place] [address]  Phone: [phone] Fax: [phone]       The doctors have asked that we provide their patients with the following 2 reminders -- prescription refills can take up to 72  hours, and a friendly reminder that in the future you can use your [facility] account to request refills:     Pt would also like to speak with you regarding another medication that is not on her file

## 2022-03-16 ENCOUNTER — PATIENT MESSAGE (OUTPATIENT)
Dept: ADMINISTRATIVE | Facility: HOSPITAL | Age: 62
End: 2022-03-16
Payer: MEDICAID

## 2022-04-14 DIAGNOSIS — I25.10 CORONARY ARTERY DISEASE INVOLVING NATIVE HEART WITHOUT ANGINA PECTORIS, UNSPECIFIED VESSEL OR LESION TYPE: ICD-10-CM

## 2022-04-14 DIAGNOSIS — I10 ESSENTIAL HYPERTENSION: ICD-10-CM

## 2022-04-15 NOTE — TELEPHONE ENCOUNTER
Refill Routing Note   Medication(s) are not appropriate for processing by [facility] for the following reason(s):      - Required laboratory values are outdated  - Required vitals are outdated    [facility] action(s):  Defer          Medication reconciliation completed: No     Appointments  past 12m or future 3m with PCP    Date Provider   Last Visit   2/24/2021 Carmen Devries MD   Next Visit   5/3/2022 Carmen Devries MD   ED visits in past 90 days: 0        Note composed:7:56 PM 04/14/2022

## 2022-04-15 NOTE — TELEPHONE ENCOUNTER
Unable to retrieve patient chart and identify care gaps.  Powered by [employer]. Reference number: [id number].   4/14/2022 7:54:04 PM CDT

## 2022-04-18 RX ORDER — LOSARTAN POTASSIUM 100 MG/1
TABLET ORAL
Qty: 90 TABLET | Refills: 0 | Status: SHIPPED | OUTPATIENT
Start: 2022-04-18 | End: 2022-06-17 | Stop reason: SDUPTHER

## 2022-06-17 DIAGNOSIS — I25.10 CORONARY ARTERY DISEASE INVOLVING NATIVE HEART WITHOUT ANGINA PECTORIS, UNSPECIFIED VESSEL OR LESION TYPE: ICD-10-CM

## 2022-06-17 DIAGNOSIS — I10 ESSENTIAL HYPERTENSION: ICD-10-CM

## 2022-06-17 RX ORDER — LOSARTAN POTASSIUM 100 MG/1
100 TABLET ORAL DAILY
Qty: 90 TABLET | Refills: 0 | Status: SHIPPED | OUTPATIENT
Start: 2022-06-17 | End: 2022-07-26 | Stop reason: SDUPTHER

## 2022-06-17 NOTE — TELEPHONE ENCOUNTER
----- Message from Genoveva Tapia sent at 6/17/2022  9:48 AM CDT -----  Contact: Pt Mobile [phone]  Patient would like a call back in regards to her wanting to schedule her and her 's physical appointment, and she would like for them to be seen at the same time please.       Patients  name is Cliff Gi, MRN# [id number].

## 2022-06-17 NOTE — TELEPHONE ENCOUNTER
----- Message from Genovevajazmin Tapia sent at 6/17/2022  9:52 AM CDT -----  Contact: Pt Mobile [phone]  Requesting an RX refill or new RX.  Is this a refill or new RX: Refill  RX name and strength  losartan (COZAAR) 100 MG tablet  Is this a 30 day or 90 day RX: N/A  Pharmacy name and phone #   [id number] - [place] [address] - [address]  AT [facility] [address]  [address] [place] [address]  Phone: [phone] Fax: [phone]  The doctors have asked that we provide their patients with the following 2 reminders -- prescription refills can take up to 72 hours, and a friendly reminder that in the future you can use your [facility] account to request refills:   Comment: Patient would like for you to send a week supply of losartan to [facility] to hold her until she's able to come in to see you.

## 2022-06-17 NOTE — TELEPHONE ENCOUNTER
----- Message from Genoveavjazmin Tapia sent at 6/17/2022  9:56 AM CDT -----  Contact: Pt Mobile [phone]  Requesting an RX refill or new RX.  Is this a refill or new RX: Refill  RX name and strength metoprolol succinate (TOPROL-XL) 50 MG 24 hr tablet  Is this a 30 day or 90 day RX: N/A  Pharmacy name and phone #   [id number] - [place] - [address]  [facility] [address]  [address] [place] [address]  Phone: [phone] Fax: [phone]  The doctors have asked that we provide their patients with the following 2 reminders -- prescription refills can take up to 72 hours, and a friendly reminder that in the future you can use your [facility] account to request refills:   Comment: Patient would like for you to send a week supply of the [facility]rolol to [facility] to hold her until she's able to come in to see you please.

## 2022-07-22 ENCOUNTER — HOSPITAL ENCOUNTER (OUTPATIENT)
Dept: RADIOLOGY | Facility: HOSPITAL | Age: 62
Discharge: HOME | End: 2022-07-22
Attending: FAMILY MEDICINE
Payer: MEDICAID

## 2022-07-22 VITALS — BODY MASS INDEX: 29.45 KG/M2 | WEIGHT: 150 LBS | HEIGHT: 60 IN

## 2022-07-22 DIAGNOSIS — Z12.31 OTHER SCREENING MAMMOGRAM: ICD-10-CM

## 2022-07-22 PROCEDURE — 77063 MAMMO DIGITAL SCREENING BILAT WITH TOMO: ICD-10-PCS | Mod: 26,,, | Performed by: RADIOLOGY

## 2022-07-22 PROCEDURE — 77067 SCR MAMMO BI INCL CAD: CPT | Mod: 26,,, | Performed by: RADIOLOGY

## 2022-07-22 PROCEDURE — 77067 MAMMO DIGITAL SCREENING BILAT WITH TOMO: ICD-10-PCS | Mod: 26,,, | Performed by: RADIOLOGY

## 2022-07-22 PROCEDURE — 77063 BREAST TOMOSYNTHESIS BI: CPT | Mod: 26,,, | Performed by: RADIOLOGY

## 2022-07-22 PROCEDURE — 77063 BREAST TOMOSYNTHESIS BI: CPT | Mod: TC

## 2022-07-26 ENCOUNTER — OFFICE VISIT (OUTPATIENT)
Dept: PRIMARY CARE CLINIC | Facility: CLINIC | Age: 62
End: 2022-07-26
Payer: MEDICAID

## 2022-07-26 ENCOUNTER — TELEPHONE (OUTPATIENT)
Dept: PRIMARY CARE CLINIC | Facility: CLINIC | Age: 62
End: 2022-07-26

## 2022-07-26 VITALS
HEART RATE: 70 BPM | SYSTOLIC BLOOD PRESSURE: 168 MMHG | DIASTOLIC BLOOD PRESSURE: 80 MMHG | BODY MASS INDEX: 28.31 KG/M2 | OXYGEN SATURATION: 98 % | WEIGHT: 149.94 LBS | TEMPERATURE: 98 F | RESPIRATION RATE: 18 BRPM | HEIGHT: 61 IN

## 2022-07-26 DIAGNOSIS — Z00.00 ANNUAL PHYSICAL EXAM: Primary | ICD-10-CM

## 2022-07-26 DIAGNOSIS — I25.10 CORONARY ARTERY DISEASE INVOLVING NATIVE HEART WITHOUT ANGINA PECTORIS, UNSPECIFIED VESSEL OR LESION TYPE: ICD-10-CM

## 2022-07-26 DIAGNOSIS — Z79.899 ENCOUNTER FOR MONITORING LONG-TERM PROTON PUMP INHIBITOR THERAPY: ICD-10-CM

## 2022-07-26 DIAGNOSIS — K22.70 BARRETT'S ESOPHAGUS WITHOUT DYSPLASIA: ICD-10-CM

## 2022-07-26 DIAGNOSIS — F41.8 SITUATIONAL ANXIETY: ICD-10-CM

## 2022-07-26 DIAGNOSIS — Z51.81 ENCOUNTER FOR MONITORING LONG-TERM PROTON PUMP INHIBITOR THERAPY: ICD-10-CM

## 2022-07-26 DIAGNOSIS — R91.8 MULTIPLE LUNG NODULES: ICD-10-CM

## 2022-07-26 DIAGNOSIS — K21.9 GASTROESOPHAGEAL REFLUX DISEASE WITHOUT ESOPHAGITIS: ICD-10-CM

## 2022-07-26 DIAGNOSIS — I10 ESSENTIAL HYPERTENSION: ICD-10-CM

## 2022-07-26 DIAGNOSIS — J43.2 CENTRILOBULAR EMPHYSEMA: ICD-10-CM

## 2022-07-26 DIAGNOSIS — E53.8 LOW SERUM VITAMIN B12: ICD-10-CM

## 2022-07-26 DIAGNOSIS — I25.10 CORONARY ARTERY DISEASE INVOLVING NATIVE CORONARY ARTERY OF NATIVE HEART WITHOUT ANGINA PECTORIS: ICD-10-CM

## 2022-07-26 DIAGNOSIS — Z72.0 TOBACCO USE: ICD-10-CM

## 2022-07-26 DIAGNOSIS — E55.9 VITAMIN D DEFICIENCY: ICD-10-CM

## 2022-07-26 PROCEDURE — 99214 OFFICE O/P EST MOD 30 MIN: CPT | Mod: PBBFAC,PN | Performed by: FAMILY MEDICINE

## 2022-07-26 PROCEDURE — 1160F PR REVIEW ALL MEDS BY PRESCRIBER/CLIN PHARMACIST DOCUMENTED: ICD-10-PCS | Mod: CPTII,,, | Performed by: FAMILY MEDICINE

## 2022-07-26 PROCEDURE — 3008F PR BODY MASS INDEX (BMI) DOCUMENTED: ICD-10-PCS | Mod: CPTII,,, | Performed by: FAMILY MEDICINE

## 2022-07-26 PROCEDURE — 99396 PREV VISIT EST AGE 40-64: CPT | Mod: S$PBB,,, | Performed by: FAMILY MEDICINE

## 2022-07-26 PROCEDURE — 4010F ACE/ARB THERAPY RXD/TAKEN: CPT | Mod: CPTII,,, | Performed by: FAMILY MEDICINE

## 2022-07-26 PROCEDURE — 1159F PR MEDICATION LIST DOCUMENTED IN MEDICAL RECORD: ICD-10-PCS | Mod: CPTII,,, | Performed by: FAMILY MEDICINE

## 2022-07-26 PROCEDURE — 1159F MED LIST DOCD IN RCRD: CPT | Mod: CPTII,,, | Performed by: FAMILY MEDICINE

## 2022-07-26 PROCEDURE — 96372 THER/PROPH/DIAG INJ SC/IM: CPT | Mod: PBBFAC,PN

## 2022-07-26 PROCEDURE — 3044F PR MOST RECENT HEMOGLOBIN A1C LEVEL <7.0%: ICD-10-PCS | Mod: CPTII,,, | Performed by: FAMILY MEDICINE

## 2022-07-26 PROCEDURE — 3079F DIAST BP 80-89 MM HG: CPT | Mod: CPTII,,, | Performed by: FAMILY MEDICINE

## 2022-07-26 PROCEDURE — 99999 PR PBB SHADOW E&M-EST. PATIENT-LVL IV: ICD-10-PCS | Mod: PBBFAC,,, | Performed by: FAMILY MEDICINE

## 2022-07-26 PROCEDURE — 3044F HG A1C LEVEL LT 7.0%: CPT | Mod: CPTII,,, | Performed by: FAMILY MEDICINE

## 2022-07-26 PROCEDURE — 99999 PR PBB SHADOW E&M-EST. PATIENT-LVL IV: CPT | Mod: PBBFAC,,, | Performed by: FAMILY MEDICINE

## 2022-07-26 PROCEDURE — 3079F PR MOST RECENT DIASTOLIC BLOOD PRESSURE 80-89 MM HG: ICD-10-PCS | Mod: CPTII,,, | Performed by: FAMILY MEDICINE

## 2022-07-26 PROCEDURE — 3008F BODY MASS INDEX DOCD: CPT | Mod: CPTII,,, | Performed by: FAMILY MEDICINE

## 2022-07-26 PROCEDURE — 1160F RVW MEDS BY RX/DR IN RCRD: CPT | Mod: CPTII,,, | Performed by: FAMILY MEDICINE

## 2022-07-26 PROCEDURE — 4010F PR ACE/ARB THEARPY RXD/TAKEN: ICD-10-PCS | Mod: CPTII,,, | Performed by: FAMILY MEDICINE

## 2022-07-26 PROCEDURE — 3077F SYST BP >= 140 MM HG: CPT | Mod: CPTII,,, | Performed by: FAMILY MEDICINE

## 2022-07-26 PROCEDURE — 99396 PR PREVENTIVE VISIT,EST,40-64: ICD-10-PCS | Mod: S$PBB,,, | Performed by: FAMILY MEDICINE

## 2022-07-26 PROCEDURE — 3077F PR MOST RECENT SYSTOLIC BLOOD PRESSURE >= 140 MM HG: ICD-10-PCS | Mod: CPTII,,, | Performed by: FAMILY MEDICINE

## 2022-07-26 RX ORDER — CYANOCOBALAMIN 1000 UG/ML
1000 INJECTION, SOLUTION INTRAMUSCULAR; SUBCUTANEOUS
Qty: 10 ML | Refills: 3 | Status: SHIPPED | OUTPATIENT
Start: 2022-07-26 | End: 2023-01-24

## 2022-07-26 RX ORDER — ATORVASTATIN CALCIUM 40 MG/1
40 TABLET, FILM COATED ORAL DAILY
Qty: 90 TABLET | Refills: 3 | Status: SHIPPED | OUTPATIENT
Start: 2022-07-26 | End: 2023-09-25

## 2022-07-26 RX ORDER — BUPROPION HYDROCHLORIDE 150 MG/1
150 TABLET ORAL DAILY
Qty: 90 TABLET | Refills: 1 | Status: SHIPPED | OUTPATIENT
Start: 2022-07-26 | End: 2023-01-20

## 2022-07-26 RX ORDER — NITROGLYCERIN 0.4 MG/1
0.4 TABLET SUBLINGUAL EVERY 5 MIN PRN
Qty: 25 TABLET | Refills: 0 | Status: SHIPPED | OUTPATIENT
Start: 2022-07-26 | End: 2022-08-16

## 2022-07-26 RX ORDER — METOPROLOL SUCCINATE 50 MG/1
50 TABLET, EXTENDED RELEASE ORAL DAILY
Qty: 90 TABLET | Refills: 3 | Status: SHIPPED | OUTPATIENT
Start: 2022-07-26 | End: 2023-09-25

## 2022-07-26 RX ORDER — CYANOCOBALAMIN 1000 UG/ML
1000 INJECTION, SOLUTION INTRAMUSCULAR; SUBCUTANEOUS ONCE
Status: COMPLETED | OUTPATIENT
Start: 2022-07-26 | End: 2022-07-26

## 2022-07-26 RX ORDER — OMEPRAZOLE 40 MG/1
40 CAPSULE, DELAYED RELEASE ORAL DAILY
Qty: 90 CAPSULE | Refills: 3 | Status: SHIPPED | OUTPATIENT
Start: 2022-07-26 | End: 2023-07-27

## 2022-07-26 RX ORDER — LOSARTAN POTASSIUM 100 MG/1
100 TABLET ORAL DAILY
Qty: 90 TABLET | Refills: 3 | Status: SHIPPED | OUTPATIENT
Start: 2022-07-26 | End: 2023-07-28

## 2022-07-26 RX ADMIN — CYANOCOBALAMIN 1000 MCG: 1000 INJECTION, SOLUTION INTRAMUSCULAR at 10:07

## 2022-07-26 NOTE — PROGRESS NOTES
"Subjective:       Patient ID: Alanna Angelo is a 62 y.o. female.    Chief Complaint: Annual Exam (Dealing with a tragic week )    HPI 61 y/o female smoker, with CAD s/p PTCA 2008, HTN, Nguyen's esophagus, Low B12, Multiple Lung nodules, Emphysema is here for annual exam.     She is feeling good, she denies f/body aches/n/v/d/constipation/cp/sob/urinary sx. Appetite good. She is sleeping fair, melatonin not helpful. She is not checking home bp. She has not been on Wellbutrin but willing to try for smoking cessation.     HTN: Toprol XL 50 mg daily, Losartan 100 mg daily  GERD/Barretts: EGD 6/2018 repeat due now, omeprazole 40 mg daily  Low B12: off injections for over a year  Emphysema/Lung nodules: following with pulmonary, PFTs ok 7/2019, CT chest 6/2019 repeat due now  GYN; hx of L breast biopsy benign 2001, mmg 7/2022, pelvic 6/2019  CAD: following with cardiology, on bb, baby asa, statin; off Plavix   Poor sleep: melatonin not helpful  Tobacco use:1 ppd  Colonoscopy: 6/2018 repeat 10 years  Eye exam due  Dental utd    Labs 7/2022 reviewed     Review of Systems  see HPI  Objective:      BP (!) 168/80 (BP Location: Left arm, Patient Position: Sitting, BP Method: Small (Manual))   Pulse 70   Temp 98.2 °F (36.8 °C) (Oral)   Resp 18   Ht 5' 1" (1.549 m)   Wt 68 kg (149 lb 14.6 oz)   SpO2 98%   BMI 28.33 kg/m²   Physical Exam  Vitals and nursing note reviewed.   Constitutional:       Appearance: She is well-developed.   HENT:      Head: Normocephalic and atraumatic.   Neck:      Thyroid: No thyromegaly.   Cardiovascular:      Rate and Rhythm: Normal rate and regular rhythm.      Heart sounds: Normal heart sounds.   Pulmonary:      Effort: Pulmonary effort is normal. No respiratory distress.      Breath sounds: Normal breath sounds.   Abdominal:      General: Abdomen is flat. Bowel sounds are normal. There is no distension.      Palpations: Abdomen is soft. There is no mass.      Tenderness: There is no " abdominal tenderness.   Musculoskeletal:      Cervical back: Normal range of motion and neck supple.      Right lower leg: No edema.      Left lower leg: No edema.   Lymphadenopathy:      Cervical: No cervical adenopathy.   Skin:     General: Skin is warm and dry.   Neurological:      Mental Status: She is alert.         Assessment:       1. Annual physical exam    2. Essential hypertension    3. Coronary artery disease involving native heart without angina pectoris, unspecified vessel or lesion type    4. Coronary artery disease involving native coronary artery of native heart without angina pectoris    5. Encounter for monitoring long-term proton pump inhibitor therapy    6. Gastroesophageal reflux disease without esophagitis    7. Nguyen's esophagus without dysplasia, repeat EGD 6/2021    8. Centrilobular emphysema    9. Multiple lung nodules    10. Tobacco use    11. Low serum vitamin B12    12. Situational anxiety    13. Vitamin D deficiency        Plan:   Alanna was seen today for annual exam.    Diagnoses and all orders for this visit:    Annual physical exam    Essential hypertension  -     losartan (COZAAR) 100 MG tablet; Take 1 tablet (100 mg total) by mouth once daily.  -     metoprolol succinate (TOPROL-XL) 50 MG 24 hr tablet; Take 1 tablet (50 mg total) by mouth once daily.  -     atorvastatin (LIPITOR) 40 MG tablet; Take 1 tablet (40 mg total) by mouth once daily.  -     Ambulatory referral/consult to Optometry; Future  -     Hypertension Digital Medicine ([facility]) Enrollment Order  -     Hypertension Digital Medicine ([facility]): Assign Onboarding Questionnaires  -     Ambulatory referral/consult to Cardiology; Future    Coronary artery disease involving native heart without angina pectoris, unspecified vessel or lesion type  -     losartan (COZAAR) 100 MG tablet; Take 1 tablet (100 mg total) by mouth once daily.  -     metoprolol succinate (TOPROL-XL) 50 MG 24 hr tablet; Take 1 tablet (50 mg total) by  mouth once daily.  -     nitroGLYCERIN (NITROSTAT) 0.4 MG SL tablet; Place 1 tablet (0.4 mg total) under the tongue every 5 (five) minutes as needed for Chest pain.  -     Ambulatory referral/consult to Cardiology; Future    Coronary artery disease involving native coronary artery of native heart without angina pectoris  -     atorvastatin (LIPITOR) 40 MG tablet; Take 1 tablet (40 mg total) by mouth once daily.    Encounter for monitoring long-term proton pump inhibitor therapy  -     omeprazole (PRILOSEC) 40 MG capsule; Take 1 capsule (40 mg total) by mouth once daily.  -     Case Request Endoscopy: ESOPHAGOGASTRODUODENOSCOPY (EGD)    Gastroesophageal reflux disease without esophagitis  -     omeprazole (PRILOSEC) 40 MG capsule; Take 1 capsule (40 mg total) by mouth once daily.  -     Case Request Endoscopy: ESOPHAGOGASTRODUODENOSCOPY (EGD)    Nguyen's esophagus without dysplasia, repeat EGD 6/2021  -     omeprazole (PRILOSEC) 40 MG capsule; Take 1 capsule (40 mg total) by mouth once daily.  -     Case Request Endoscopy: ESOPHAGOGASTRODUODENOSCOPY (EGD)  -     cyanocobalamin injection 1,000 mcg    Centrilobular emphysema  -     CT Chest Without Contrast; Future    Multiple lung nodules  -     CT Chest Without Contrast; Future    Tobacco use  -     atorvastatin (LIPITOR) 40 MG tablet; Take 1 tablet (40 mg total) by mouth once daily.  -     CT Chest Without Contrast; Future  -     buPROPion (WELLBUTRIN XL) 150 MG TB24 tablet; Take 1 tablet (150 mg total) by mouth once daily.    Low serum vitamin B12  -     cyanocobalamin 1,000 mcg/mL injection; Inject 1 mL (1,000 mcg total) into the muscle every 28 days. Please dispense 21 g x 1 inch needles with the script.  -     cyanocobalamin injection 1,000 mcg    Situational anxiety  -     buPROPion (WELLBUTRIN XL) 150 MG TB24 tablet; Take 1 tablet (150 mg total) by mouth once daily.    Vitamin D deficiency

## 2022-07-26 NOTE — TELEPHONE ENCOUNTER
Please let her know that it looks like Losartan 100 mg is 9 dollars for 30 pills at [facility] if she wants me to send it there and she can use her good Rx coupon.  If not I would be happy to switch her to Olmesartan which is usually more expensive.

## 2022-07-26 NOTE — TELEPHONE ENCOUNTER
Per pharmacy Losartan 100mg is not covered by patient plan. The preferred medication is Olmesartan.    Not sure of the dosage. Please advise

## 2022-07-28 ENCOUNTER — PATIENT MESSAGE (OUTPATIENT)
Dept: PRIMARY CARE CLINIC | Facility: CLINIC | Age: 62
End: 2022-07-28
Payer: MEDICAID

## 2022-08-01 ENCOUNTER — HOSPITAL ENCOUNTER (OUTPATIENT)
Dept: RADIOLOGY | Facility: HOSPITAL | Age: 62
Discharge: HOME | End: 2022-08-01
Attending: FAMILY MEDICINE
Payer: MEDICAID

## 2022-08-01 DIAGNOSIS — Z72.0 TOBACCO USE: ICD-10-CM

## 2022-08-01 DIAGNOSIS — R91.8 MULTIPLE LUNG NODULES: ICD-10-CM

## 2022-08-01 DIAGNOSIS — J43.2 CENTRILOBULAR EMPHYSEMA: ICD-10-CM

## 2022-08-01 PROCEDURE — 71250 CT CHEST WITHOUT CONTRAST: ICD-10-PCS | Mod: 26,,, | Performed by: RADIOLOGY

## 2022-08-01 PROCEDURE — 71250 CT THORAX DX C-: CPT | Mod: 26,,, | Performed by: RADIOLOGY

## 2022-08-01 PROCEDURE — 71250 CT THORAX DX C-: CPT | Mod: TC

## 2022-08-08 ENCOUNTER — PATIENT MESSAGE (OUTPATIENT)
Dept: PRIMARY CARE CLINIC | Facility: CLINIC | Age: 62
End: 2022-08-08
Payer: MEDICAID

## 2022-08-08 DIAGNOSIS — Z72.0 TOBACCO USE: ICD-10-CM

## 2022-08-08 DIAGNOSIS — R91.8 MULTIPLE LUNG NODULES: ICD-10-CM

## 2022-08-08 DIAGNOSIS — J43.2 CENTRILOBULAR EMPHYSEMA: Primary | ICD-10-CM

## 2022-08-15 DIAGNOSIS — I25.10 CORONARY ARTERY DISEASE INVOLVING NATIVE HEART WITHOUT ANGINA PECTORIS, UNSPECIFIED VESSEL OR LESION TYPE: ICD-10-CM

## 2022-08-15 NOTE — TELEPHONE ENCOUNTER
No new care gaps identified.  [facility] Embedded Care Gaps. Reference number: [id number]. 8/15/2022   2:19:01 PM CDT

## 2022-08-16 RX ORDER — NITROGLYCERIN 0.4 MG/1
TABLET SUBLINGUAL
Qty: 25 TABLET | Refills: 11 | Status: SHIPPED | OUTPATIENT
Start: 2022-08-16

## 2022-08-16 NOTE — TELEPHONE ENCOUNTER
Refill Authorization Note   Alanna Thomsonr  is requesting a refill authorization.  Brief Assessment and Rationale for Refill:  Approve     Medication Therapy Plan:       Medication Reconciliation Completed: No   Comments:     No Care Gaps recommended.     Note composed:11:10 AM 08/16/2022

## 2022-08-24 ENCOUNTER — PATIENT MESSAGE (OUTPATIENT)
Dept: ADMINISTRATIVE | Facility: HOSPITAL | Age: 62
End: 2022-08-24
Payer: MEDICAID

## 2022-09-13 ENCOUNTER — TELEPHONE (OUTPATIENT)
Dept: PRIMARY CARE CLINIC | Facility: CLINIC | Age: 62
End: 2022-09-13
Payer: MEDICAID

## 2022-09-13 NOTE — TELEPHONE ENCOUNTER
----- Message from Celeste Patel sent at 9/13/2022  9:22 AM CDT -----  Regarding: Patient request  Patient stated she's waiting on verification for her prescription refill Atorvistatin 40MG....

## 2022-09-13 NOTE — TELEPHONE ENCOUNTER
Spoke with pt re: at[facility]vastatin [facility]   Pt will call [facility]

## 2022-10-07 DIAGNOSIS — Z80.0 FAMILY HISTORY OF ESOPHAGEAL CANCER: ICD-10-CM

## 2022-10-07 DIAGNOSIS — Z12.11 COLON CANCER SCREENING: ICD-10-CM

## 2022-10-07 DIAGNOSIS — K22.70 BARRETT'S ESOPHAGUS WITHOUT DYSPLASIA: ICD-10-CM

## 2022-10-07 DIAGNOSIS — K21.9 GASTROESOPHAGEAL REFLUX DISEASE WITHOUT ESOPHAGITIS: Primary | ICD-10-CM

## 2022-10-10 ENCOUNTER — PATIENT MESSAGE (OUTPATIENT)
Dept: ADMINISTRATIVE | Facility: HOSPITAL | Age: 62
End: 2022-10-10
Payer: MEDICAID

## 2022-12-05 ENCOUNTER — OFFICE VISIT (OUTPATIENT)
Dept: OPTOMETRY | Facility: CLINIC | Age: 62
End: 2022-12-05
Payer: MEDICAID

## 2022-12-05 DIAGNOSIS — H52.203 HYPEROPIA OF BOTH EYES WITH ASTIGMATISM AND PRESBYOPIA: ICD-10-CM

## 2022-12-05 DIAGNOSIS — H52.03 HYPEROPIA OF BOTH EYES WITH ASTIGMATISM AND PRESBYOPIA: ICD-10-CM

## 2022-12-05 DIAGNOSIS — H52.4 HYPEROPIA OF BOTH EYES WITH ASTIGMATISM AND PRESBYOPIA: ICD-10-CM

## 2022-12-05 DIAGNOSIS — H25.13 NUCLEAR SCLEROSIS, BILATERAL: Primary | ICD-10-CM

## 2022-12-05 DIAGNOSIS — I10 ESSENTIAL HYPERTENSION: ICD-10-CM

## 2022-12-05 PROCEDURE — 4010F ACE/ARB THERAPY RXD/TAKEN: CPT | Mod: CPTII,,, | Performed by: OPTOMETRIST

## 2022-12-05 PROCEDURE — 92004 PR EYE EXAM, NEW PATIENT,COMPREHESV: ICD-10-PCS | Mod: S$PBB,,, | Performed by: OPTOMETRIST

## 2022-12-05 PROCEDURE — 92004 COMPRE OPH EXAM NEW PT 1/>: CPT | Mod: S$PBB,,, | Performed by: OPTOMETRIST

## 2022-12-05 PROCEDURE — 92015 DETERMINE REFRACTIVE STATE: CPT | Mod: ,,, | Performed by: OPTOMETRIST

## 2022-12-05 PROCEDURE — 3044F HG A1C LEVEL LT 7.0%: CPT | Mod: CPTII,,, | Performed by: OPTOMETRIST

## 2022-12-05 PROCEDURE — 4010F PR ACE/ARB THEARPY RXD/TAKEN: ICD-10-PCS | Mod: CPTII,,, | Performed by: OPTOMETRIST

## 2022-12-05 PROCEDURE — 99999 PR PBB SHADOW E&M-EST. PATIENT-LVL III: CPT | Mod: PBBFAC,,, | Performed by: OPTOMETRIST

## 2022-12-05 PROCEDURE — 1159F PR MEDICATION LIST DOCUMENTED IN MEDICAL RECORD: ICD-10-PCS | Mod: CPTII,,, | Performed by: OPTOMETRIST

## 2022-12-05 PROCEDURE — 3044F PR MOST RECENT HEMOGLOBIN A1C LEVEL <7.0%: ICD-10-PCS | Mod: CPTII,,, | Performed by: OPTOMETRIST

## 2022-12-05 PROCEDURE — 99213 OFFICE O/P EST LOW 20 MIN: CPT | Mod: PBBFAC | Performed by: OPTOMETRIST

## 2022-12-05 PROCEDURE — 99999 PR PBB SHADOW E&M-EST. PATIENT-LVL III: ICD-10-PCS | Mod: PBBFAC,,, | Performed by: OPTOMETRIST

## 2022-12-05 PROCEDURE — 92015 PR REFRACTION: ICD-10-PCS | Mod: ,,, | Performed by: OPTOMETRIST

## 2022-12-05 PROCEDURE — 1159F MED LIST DOCD IN RCRD: CPT | Mod: CPTII,,, | Performed by: OPTOMETRIST

## 2022-12-05 NOTE — PROGRESS NOTES
HPI    New Patient    C/o blurry Va ou at both distance and near, no pain or f/f.    No gtts  No previous sx or injury  Mom had cataracts  Last edited by Mindi Cordero on 12/5/2022  3:00 PM.            Assessment /Plan     For exam results, see Encounter Report.    Nuclear sclerosis, bilateral  -Educated patient on presence of cataracts at today's exam, monitor at annual dilated fundus exam. 5+ years surgical estimate.    Essential hypertension  -     Ambulatory referral/consult to Optometry  -No retinopathy noted today.  Continued control with primary care physician and annual comprehensive eye exam.    Hyperopia of both eyes with astigmatism and presbyopia  Eyeglass Final Rx       Eyeglass Final Rx         Sphere Cylinder Axis Dist VA Add    Right +1.25 +0.75 060 20/20 +2.50    Left +1.25 Sphere  20/20 +2.50      Type: Bifocal    Expiration Date: 12/5/2023                      RTC 1 yr

## 2023-01-09 ENCOUNTER — CLINICAL SUPPORT (OUTPATIENT)
Dept: ENDOSCOPY | Facility: HOSPITAL | Age: 63
End: 2023-01-09
Attending: FAMILY MEDICINE
Payer: MEDICAID

## 2023-01-09 VITALS — HEIGHT: 60 IN | WEIGHT: 140 LBS | BODY MASS INDEX: 27.48 KG/M2

## 2023-01-09 DIAGNOSIS — Z12.11 COLON CANCER SCREENING: ICD-10-CM

## 2023-01-09 DIAGNOSIS — Z80.0 FAMILY HISTORY OF ESOPHAGEAL CANCER: ICD-10-CM

## 2023-01-09 DIAGNOSIS — K22.70 BARRETT'S ESOPHAGUS WITHOUT DYSPLASIA: ICD-10-CM

## 2023-01-09 NOTE — PLAN OF CARE
Spoke to patient. EGD scheduled. 2/2/23 with an arrival time of 10:30 AM confirmed.  Instructions reviewed and verbalized. Instructions mailed per patient's request.  Patient verbalized an understanding.

## 2023-01-23 ENCOUNTER — TELEPHONE (OUTPATIENT)
Dept: PRIMARY CARE CLINIC | Facility: CLINIC | Age: 63
End: 2023-01-23
Payer: MEDICAID

## 2023-02-02 ENCOUNTER — HOSPITAL ENCOUNTER (OUTPATIENT)
Facility: HOSPITAL | Age: 63
Discharge: HOME | End: 2023-02-02
Attending: STUDENT IN AN ORGANIZED HEALTH CARE EDUCATION/TRAINING PROGRAM | Admitting: STUDENT IN AN ORGANIZED HEALTH CARE EDUCATION/TRAINING PROGRAM
Payer: MEDICAID

## 2023-02-02 ENCOUNTER — ANESTHESIA EVENT (OUTPATIENT)
Dept: ENDOSCOPY | Facility: HOSPITAL | Age: 63
End: 2023-02-02
Payer: MEDICAID

## 2023-02-02 ENCOUNTER — ANESTHESIA (OUTPATIENT)
Dept: ENDOSCOPY | Facility: HOSPITAL | Age: 63
End: 2023-02-02
Payer: MEDICAID

## 2023-02-02 VITALS
BODY MASS INDEX: 28.47 KG/M2 | HEART RATE: 60 BPM | RESPIRATION RATE: 18 BRPM | WEIGHT: 145 LBS | OXYGEN SATURATION: 99 % | HEIGHT: 60 IN | SYSTOLIC BLOOD PRESSURE: 120 MMHG | TEMPERATURE: 98 F | DIASTOLIC BLOOD PRESSURE: 60 MMHG

## 2023-02-02 DIAGNOSIS — K22.70 BARRETT'S ESOPHAGUS: ICD-10-CM

## 2023-02-02 DIAGNOSIS — K22.70 BARRETT'S ESOPHAGUS WITHOUT DYSPLASIA: Primary | ICD-10-CM

## 2023-02-02 PROCEDURE — 63600175 PHARM REV CODE 636 W HCPCS: Performed by: NURSE ANESTHETIST, CERTIFIED REGISTERED

## 2023-02-02 PROCEDURE — E9220 PRA ENDO ANESTHESIA: ICD-10-PCS | Mod: ,,, | Performed by: NURSE ANESTHETIST, CERTIFIED REGISTERED

## 2023-02-02 PROCEDURE — 88305 TISSUE EXAM BY PATHOLOGIST: CPT | Performed by: PATHOLOGY

## 2023-02-02 PROCEDURE — 43239 EGD BIOPSY SINGLE/MULTIPLE: CPT | Mod: ,,, | Performed by: STUDENT IN AN ORGANIZED HEALTH CARE EDUCATION/TRAINING PROGRAM

## 2023-02-02 PROCEDURE — E9220 PRA ENDO ANESTHESIA: HCPCS | Mod: ,,, | Performed by: NURSE ANESTHETIST, CERTIFIED REGISTERED

## 2023-02-02 PROCEDURE — 43239 EGD BIOPSY SINGLE/MULTIPLE: CPT | Performed by: STUDENT IN AN ORGANIZED HEALTH CARE EDUCATION/TRAINING PROGRAM

## 2023-02-02 PROCEDURE — 43239 PR EGD, FLEX, W/BIOPSY, SGL/MULTI: ICD-10-PCS | Mod: ,,, | Performed by: STUDENT IN AN ORGANIZED HEALTH CARE EDUCATION/TRAINING PROGRAM

## 2023-02-02 PROCEDURE — 82962 GLUCOSE BLOOD TEST: CPT | Performed by: STUDENT IN AN ORGANIZED HEALTH CARE EDUCATION/TRAINING PROGRAM

## 2023-02-02 PROCEDURE — 88305 TISSUE EXAM BY PATHOLOGIST: CPT | Mod: 26,,, | Performed by: PATHOLOGY

## 2023-02-02 PROCEDURE — 00731 ANES UPR GI NDSC PX NOS: CPT | Performed by: STUDENT IN AN ORGANIZED HEALTH CARE EDUCATION/TRAINING PROGRAM

## 2023-02-02 PROCEDURE — 25000003 PHARM REV CODE 250: Performed by: NURSE ANESTHETIST, CERTIFIED REGISTERED

## 2023-02-02 PROCEDURE — 37000008 HC ANESTHESIA 1ST 15 MINUTES: Performed by: STUDENT IN AN ORGANIZED HEALTH CARE EDUCATION/TRAINING PROGRAM

## 2023-02-02 PROCEDURE — 88305 TISSUE EXAM BY PATHOLOGIST: ICD-10-PCS | Mod: 26,,, | Performed by: PATHOLOGY

## 2023-02-02 PROCEDURE — 37000009 HC ANESTHESIA EA ADD 15 MINS: Performed by: STUDENT IN AN ORGANIZED HEALTH CARE EDUCATION/TRAINING PROGRAM

## 2023-02-02 PROCEDURE — 27201012 HC FORCEPS, HOT/COLD, DISP: Performed by: STUDENT IN AN ORGANIZED HEALTH CARE EDUCATION/TRAINING PROGRAM

## 2023-02-02 RX ORDER — SODIUM CHLORIDE 9 MG/ML
INJECTION, SOLUTION INTRAVENOUS CONTINUOUS
Status: DISCONTINUED | OUTPATIENT
Start: 2023-02-02 | End: 2023-02-02 | Stop reason: HOSPADM

## 2023-02-02 RX ORDER — PROPOFOL 10 MG/ML
VIAL (ML) INTRAVENOUS
Status: DISCONTINUED | OUTPATIENT
Start: 2023-02-02 | End: 2023-02-02

## 2023-02-02 RX ORDER — LIDOCAINE HYDROCHLORIDE 20 MG/ML
INJECTION INTRAVENOUS
Status: DISCONTINUED | OUTPATIENT
Start: 2023-02-02 | End: 2023-02-02

## 2023-02-02 RX ORDER — PROPOFOL 10 MG/ML
VIAL (ML) INTRAVENOUS CONTINUOUS PRN
Status: DISCONTINUED | OUTPATIENT
Start: 2023-02-02 | End: 2023-02-02

## 2023-02-02 RX ADMIN — PROPOFOL 80 MG: 10 INJECTION, EMULSION INTRAVENOUS at 10:02

## 2023-02-02 RX ADMIN — LIDOCAINE HYDROCHLORIDE 80 MG: 20 INJECTION INTRAVENOUS at 10:02

## 2023-02-02 RX ADMIN — SODIUM CHLORIDE: 9 INJECTION, SOLUTION INTRAVENOUS at 10:02

## 2023-02-02 RX ADMIN — Medication 150 MCG/KG/MIN: at 10:02

## 2023-02-02 NOTE — PROVATION PATIENT INSTRUCTIONS
Discharge Summary/Instructions after an Endoscopic Procedure  Patient Name: Alanna Angelo  Patient MRN: [id number]  Patient YOB: 1960  Thursday, February 2, 2023  Carol Tijerina MD  Dear patient,  As a result of recent federal legislation (The Federal Cures Act), you may   receive lab or pathology results from your procedure in your [facility]   account before your physician is able to contact you. Your physician or   their representative will relay the results to you with their   recommendations at their soonest availability.  Thank you,  RESTRICTIONS:  During your procedure today, you received medications for sedation.  These   medications may affect your judgment, balance and coordination.  Therefore,   for 24 hours, you have the following restrictions:   - DO NOT drive a car, operate machinery, make legal/financial decisions,   sign important papers or drink alcohol.    ACTIVITY:  Today: no heavy lifting, straining or running due to procedural   sedation/anesthesia.  The following day: return to full activity including work.  DIET:  Eat and drink normally unless instructed otherwise.     TREATMENT FOR COMMON SIDE EFFECTS:  - Mild abdominal pain, nausea, belching, bloating or excessive gas:  rest,   eat lightly and use a heating pad.  - Sore Throat: treat with throat lozenges and/or gargle with warm salt   water.  - Because air was used during the procedure, expelling large amounts of air   from your rectum or belching is normal.  - If a bowel prep was taken, you may not have a bowel movement for 1-3 days.    This is normal.  SYMPTOMS TO WATCH FOR AND REPORT TO YOUR PHYSICIAN:  1. Abdominal pain or bloating, other than gas cramps.  2. Chest pain.  3. Back pain.  4. Signs of infection such as: chills or fever occurring within 24 hours   after the procedure.  5. Rectal bleeding, which would show as bright red, maroon, or black stools.   (A tablespoon of blood from the rectum is not serious, especially if    hemorrhoids are present.)  6. Vomiting.  7. Weakness or dizziness.  GO DIRECTLY TO THE NEAREST EMERGENCY ROOM IF YOU HAVE ANY OF THE FOLLOWING:      Difficulty breathing              Chills and/or fever over 101 F   Persistent vomiting and/or vomiting blood   Severe abdominal pain   Severe chest pain   Black, tarry stools   Bleeding- more than one tablespoon   Any other symptom or condition that you feel may need urgent attention  Your doctor recommends these additional instructions:  If any biopsies were taken, your doctors clinic will contact you in 1 to 2   weeks with any results.  - Discharge patient to home (ambulatory).   - Resume regular diet.   - Continue present medications.   - Await pathology results.   - Repeat upper endoscopy in 3 years for surveillance of Nguyen's   esophagus.  For questions, problems or results please call your physician - Carol Tijerina MD at Work:  [phone].  [facility], EMERGENCY ROOM PHONE NUMBER: [phone]  IF A COMPLICATION OR EMERGENCY SITUATION ARISES AND YOU ARE UNABLE TO REACH   YOUR PHYSICIAN - GO DIRECTLY TO THE EMERGENCY ROOM.  Carol Tijerina MD  2/2/2023 10:48:27 AM  This report has been verified and signed electronically.  Dear patient,  As a result of recent federal legislation (The Federal Cures Act), you may   receive lab or pathology results from your procedure in your [facility]   account before your physician is able to contact you. Your physician or   their representative will relay the results to you with their   recommendations at their soonest availability.  Thank you,  PROVATION

## 2023-02-02 NOTE — TRANSFER OF CARE
Anesthesia Transfer of Care Note    Patient: Alanna Angelo    Procedure(s) Performed: Procedure(s) (LRB):  EGD (ESOPHAGOGASTRODUODENOSCOPY) (N/A)    Patient location: PACU    Anesthesia Type: general    Transport from OR: Transported from OR on room air with adequate spontaneous ventilation    Post pain: adequate analgesia    Post assessment: no apparent anesthetic complications and tolerated procedure well    Post vital signs: stable    Level of consciousness: awake, alert and oriented    Nausea/Vomiting: no nausea/vomiting    Complications: none    Transfer of care protocol was followed      Last vitals:   Visit Vitals  BP (!) 94/64   Pulse 65   Temp 98   Resp 18   Ht 5' (1.524 m)   Wt 65.8 kg (145 lb)   SpO2 99%   Breastfeeding No   BMI 28.32 kg/m²

## 2023-02-02 NOTE — ANESTHESIA PREPROCEDURE EVALUATION
2023  Alanna Angelo is a 62 y.o., female.        Patient Name: Alanna Angelo  YOB: 1960  MRN: [id number]  [facility]: [id number]      Code Status: No Order   Date of Procedure: 2023  Anesthesia: Choice Procedure: Procedure(s) (LRB):  EGD (ESOPHAGOGASTRODUODENOSCOPY) (N/A)  Pre-Operative Diagnosis: Family history of esophageal cancer [Z80.0]  Nguyen's esophagus without dysplasia [K22.70]  Proceduralist: Surgeon(s) and Role:     * Carol Tijerina MD - Primary        SUBJECTIVE:   Alanna Angelo is a 62 y.o. female who  has a past medical history of Nguyen's esophagus without dysplasia, Coronary artery disease, GERD (gastroesophageal reflux disease), and Hypertension. No notes on file    ALLERGIES:   Review of patient's allergies indicates:  No Known Allergies  MEDICATIONS:     Current Facility-Administered Medications   Medication Dose Route Frequency Provider Last Rate Last Admin    0.9%  NaCl infusion   Intravenous Continuous Carol Tijerina MD              History:     Patient Active Problem List   Diagnosis    Essential hypertension    Encounter for monitoring long-term proton pump inhibitor therapy    Gastroesophageal reflux disease without esophagitis    Family history of esophageal cancer    Low vitamin B12 level    Vitamin D deficiency    Coronary artery disease involving native coronary artery of native heart without angina pectoris    Multiple lung nodules    Centrilobular emphysema    Tobacco use    Colon cancer screening    Nguyen's esophagus without dysplasia, repeat EGD 2021    Atherosclerosis of aorta     Surgical History:    has a past surgical history that includes Breast surgery; Hemorrhoid surgery;  section; Esophagogastroduodenoscopy (N/A, 2018); Colonoscopy (N/A, 2018); Upper gastrointestinal endoscopy; Breast  biopsy (Left); and Coronary angioplasty with stent (20018).   Social History:    has no history on file for sexual activity.  reports that she has been smoking cigarettes. She started smoking about 48 years ago. She has a 40.00 pack-year smoking history. She has never used smokeless tobacco. She reports that she does not drink alcohol and does not use drugs.     Pre-op Assessment    I have reviewed the Patient Summary Reports.     I have reviewed the Nursing Notes. I have reviewed the NPO Status.   I have reviewed the Medications.     Review of Systems  Anesthesia Hx:  No problems with previous Anesthesia  Denies Family Hx of Anesthesia complications.   Denies Personal Hx of Anesthesia complications.   Hematology/Oncology:  Hematology Normal        Cardiovascular:   Hypertension CAD      Pulmonary:   COPD    Hepatic/GI:   Bowel Prep. GERD    Musculoskeletal:  Musculoskeletal Normal    Neurological:  Neurology Normal    Dermatological:  Skin Normal        Physical Exam  General: Cooperative, Alert and Oriented    Airway:  Mallampati: II   Mouth Opening: Normal  TM Distance: Normal  Tongue: Normal  Neck ROM: Normal ROM    Dental:  Intact        Anesthesia Plan  Type of Anesthesia, risks & benefits discussed:    Anesthesia Type: Gen Natural Airway  Intra-op Monitoring Plan: Standard ASA Monitors  Induction:  IV  Informed Consent: Informed consent signed with the Patient and all parties understand the risks and agree with anesthesia plan.  All questions answered.   ASA Score: 3  Day of Surgery Review of History & Physical: H&P Update referred to the surgeon/provider.I have interviewed and examined the patient. I have reviewed the patient's H&P dated: There are no significant changes.     Ready For Surgery From Anesthesia Perspective.     .

## 2023-02-02 NOTE — H&P
Short Stay Endoscopy History and Physical    PCP - Carmen Devries MD  Referring Physician - Carmen Devries MD  [address]  [place] [address]    Procedure - EGD  ASA - per anesthesia  Mallampati - per anesthesia  History of Anesthesia problems - no  Family history Anesthesia problems -  no   Plan of anesthesia - General    HPI  62 y.o. female  Reason for procedure:  Family history of esophageal cancer [Z80.0]  Nguyen's esophagus without dysplasia [K22.70]      ROS:  Constitutional: No fevers, chills, No weight loss  CV: No chest pain  Pulm: No cough, No shortness of breath  GI: see HPI    Medical History:  has a past medical history of Nguyen's esophagus without dysplasia, Coronary artery disease, GERD (gastroesophageal reflux disease), and Hypertension.    Surgical History:  has a past surgical history that includes Breast surgery; Hemorrhoid surgery;  section; Esophagogastroduodenoscopy (N/A, 2018); Colonoscopy (N/A, 2018); Upper gastrointestinal endoscopy; Breast biopsy (Left); and Coronary angioplasty with stent ().    Family History: family history includes Cancer in her father; Cataracts in her mother; Heart disease in her brother and father; Hyperlipidemia in her brother and father; Hypertension in her brother and father; Rheum arthritis in her mother; Stroke in her father; Tuberculosis in her father..    Social History:  reports that she has been smoking cigarettes. She started smoking about 48 years ago. She has a 40.00 pack-year smoking history. She has never used smokeless tobacco. She reports that she does not drink alcohol and does not use drugs.    Review of patient's allergies indicates:  No Known Allergies    Medications:   Medications Prior to Admission   Medication Sig Dispense Refill Last Dose    aspirin (ECOTRIN) 81 MG EC tablet Take 81 mg by mouth once daily.       atorvastatin (LIPITOR) 40 MG tablet Take 1 tablet (40 mg total) by mouth once daily. 90 tablet  3     buPROPion (WELLBUTRIN XL) 150 MG TB24 tablet TAKE 1 TABLET(150 MG) BY MOUTH EVERY DAY 90 tablet 1     cholecalciferol, vitamin D3, (VITAMIN D3) 50 mcg (2,000 unit) Cap capsule Take 1 capsule by mouth once daily.       cyanocobalamin (DODEX) 1,000 mcg/mL injection INJECT 1 ML( 1000 MCG TOTAL) IN THE MUSCLE EVERY 28 DAYS 1 mL 6     fluticasone propionate (FLONASE) 50 mcg/actuation nasal spray 1 spray (50 mcg total) by Each Nostril route once daily. (Patient not taking: Reported on 12/5/2022) 16 g 6     losartan (COZAAR) 100 MG tablet Take 1 tablet (100 mg total) by mouth once daily. 90 tablet 3     metoprolol succinate (TOPROL-XL) 50 MG 24 hr tablet Take 1 tablet (50 mg total) by mouth once daily. 90 tablet 3     nitroGLYCERIN (NITROSTAT) 0.4 MG SL tablet DISSOLVE 1 TABLET UNDER THE TONGUE EVERY 5 MINUTES AS NEEDED FOR CHEST PAIN 25 tablet 11     omega-3 fatty acids-fish oil (FISH OIL) 360-1,200 mg CpDR Take 2 capsules by mouth once daily.       omeprazole (PRILOSEC) 40 MG capsule Take 1 capsule (40 mg total) by mouth once daily. 90 capsule 3     simethicone (MYLICON) 125 mg Cap capsule Take 125 mg by mouth every other day.          Physical Exam:    Vital Signs: There were no vitals filed for this visit.    General Appearance: Well appearing in no acute distress  Abdomen: Soft, non tender, non distended with normal bowel sounds, no masses      Labs:  Lab Results   Component Value Date    WBC 4.95 07/22/2022    HGB 12.4 07/22/2022    HCT 38.7 07/22/2022     07/22/2022    CHOL 146 07/22/2022    TRIG 127 07/22/2022    HDL 38 (L) 07/22/2022    ALT 11 07/22/2022    AST 16 07/22/2022     07/22/2022    K 4.4 07/22/2022     07/22/2022    CREATININE 0.9 07/22/2022    BUN 11 07/22/2022    CO2 27 07/22/2022    TSH 3.589 07/22/2022    HGBA1C 4.9 07/22/2022       I have explained the risks and benefits of this endoscopic procedure to the patient including but not limited to bleeding, inflammation,  infection, perforation, and death.    Assessment/Plan:     Nguyen's Esophagus     - Proceed with EGD     Carol Tijerina MD  Gastroenterology   [facility]

## 2023-02-03 NOTE — ANESTHESIA POSTPROCEDURE EVALUATION
Anesthesia Post Evaluation    Patient: Alanna Angelo    Procedure(s) Performed: Procedure(s) (LRB):  EGD (ESOPHAGOGASTRODUODENOSCOPY) (N/A)    Final Anesthesia Type: general      Patient location during evaluation: GI PACU  Patient participation: Yes- Able to Participate  Level of consciousness: awake  Post-procedure vital signs: reviewed and stable  Pain management: adequate  Airway patency: patent    PONV status at discharge: No PONV  Anesthetic complications: no      Cardiovascular status: blood pressure returned to baseline  Respiratory status: unassisted  Hydration status: euvolemic  Follow-up not needed.          Vitals Value Taken Time   /60 02/02/23 1105   Temp 36.7 °C (98 °F) 02/02/23 1041   Pulse 60 02/02/23 1105   Resp 18 02/02/23 1105   SpO2 99 % 02/02/23 1105         Event Time   Out of Recovery 11:06:27         Pain/Derick Score: Derick Score: 10 (2/2/2023 11:05 AM)

## 2023-02-10 LAB
FINAL PATHOLOGIC DIAGNOSIS: NORMAL
GROSS: NORMAL
Lab: NORMAL

## 2023-04-03 ENCOUNTER — PATIENT MESSAGE (OUTPATIENT)
Dept: ADMINISTRATIVE | Facility: HOSPITAL | Age: 63
End: 2023-04-03
Payer: MEDICAID

## 2023-07-27 DIAGNOSIS — Z12.31 SCREENING MAMMOGRAM FOR BREAST CANCER: ICD-10-CM

## 2023-07-27 DIAGNOSIS — K22.70 BARRETT'S ESOPHAGUS WITHOUT DYSPLASIA: ICD-10-CM

## 2023-07-27 DIAGNOSIS — Z13.820 SCREENING FOR OSTEOPOROSIS: ICD-10-CM

## 2023-07-27 DIAGNOSIS — Z13.220 ENCOUNTER FOR LIPID SCREENING FOR CARDIOVASCULAR DISEASE: ICD-10-CM

## 2023-07-27 DIAGNOSIS — Z79.899 ENCOUNTER FOR MONITORING LONG-TERM PROTON PUMP INHIBITOR THERAPY: ICD-10-CM

## 2023-07-27 DIAGNOSIS — Z51.81 ENCOUNTER FOR MONITORING LONG-TERM PROTON PUMP INHIBITOR THERAPY: ICD-10-CM

## 2023-07-27 DIAGNOSIS — Z13.6 ENCOUNTER FOR LIPID SCREENING FOR CARDIOVASCULAR DISEASE: ICD-10-CM

## 2023-07-27 DIAGNOSIS — E55.9 VITAMIN D DEFICIENCY: ICD-10-CM

## 2023-07-27 DIAGNOSIS — I25.10 CORONARY ARTERY DISEASE INVOLVING NATIVE HEART WITHOUT ANGINA PECTORIS, UNSPECIFIED VESSEL OR LESION TYPE: ICD-10-CM

## 2023-07-27 DIAGNOSIS — I10 ESSENTIAL HYPERTENSION: ICD-10-CM

## 2023-07-27 DIAGNOSIS — K21.9 GASTROESOPHAGEAL REFLUX DISEASE WITHOUT ESOPHAGITIS: ICD-10-CM

## 2023-07-27 DIAGNOSIS — I25.10 CORONARY ARTERY DISEASE INVOLVING NATIVE CORONARY ARTERY OF NATIVE HEART WITHOUT ANGINA PECTORIS: ICD-10-CM

## 2023-07-27 DIAGNOSIS — Z78.0 POST-MENOPAUSAL: ICD-10-CM

## 2023-07-27 DIAGNOSIS — E53.8 LOW SERUM VITAMIN B12: ICD-10-CM

## 2023-07-27 DIAGNOSIS — Z00.00 ANNUAL PHYSICAL EXAM: Primary | ICD-10-CM

## 2023-07-27 RX ORDER — OMEPRAZOLE 40 MG/1
CAPSULE, DELAYED RELEASE ORAL
Qty: 90 CAPSULE | Refills: 0 | Status: SHIPPED | OUTPATIENT
Start: 2023-07-27 | End: 2023-10-25

## 2023-07-27 NOTE — TELEPHONE ENCOUNTER
No care due was identified.  [facility] Embedded Care Due Messages. Reference number: [id number].   7/27/2023 5:29:03 AM CDT

## 2023-07-27 NOTE — TELEPHONE ENCOUNTER
Refill Routing Note   Medication(s) are not appropriate for processing by [facility] for the following reason(s):      Required labs outdated    [facility] action(s):  Defer  Approve Care Due:  None identified            Appointments  past 12m or future 3m with PCP    Date Provider   Last Visit   7/26/2022 Carmen Devries MD   Next Visit   Visit date not found Carmen Devries MD   ED visits in past 90 days: 0        Note composed:12:11 PM 07/27/2023

## 2023-07-28 RX ORDER — LOSARTAN POTASSIUM 100 MG/1
TABLET ORAL
Qty: 90 TABLET | Refills: 0 | Status: SHIPPED | OUTPATIENT
Start: 2023-07-28 | End: 2023-10-25

## 2023-07-28 NOTE — TELEPHONE ENCOUNTER
Please set her up for an annual exam as soon as possible and labs as soon as possible.    Please also schedule her for a mammogram, bone density, and a pelvic exam.  Please also tell her she can get her COVID by Valent booster at the pharmacy as well as her shingles vaccine

## 2023-08-01 ENCOUNTER — HOSPITAL ENCOUNTER (OUTPATIENT)
Dept: RADIOLOGY | Facility: HOSPITAL | Age: 63
Discharge: HOME | End: 2023-08-01
Attending: FAMILY MEDICINE
Payer: MEDICAID

## 2023-08-01 VITALS — BODY MASS INDEX: 29.88 KG/M2 | WEIGHT: 153 LBS

## 2023-08-01 DIAGNOSIS — Z12.31 SCREENING MAMMOGRAM FOR BREAST CANCER: ICD-10-CM

## 2023-08-01 PROCEDURE — 77067 MAMMO DIGITAL SCREENING BILAT WITH TOMO: ICD-10-PCS | Mod: 26,,, | Performed by: RADIOLOGY

## 2023-08-01 PROCEDURE — 77063 BREAST TOMOSYNTHESIS BI: CPT | Mod: 26,,, | Performed by: RADIOLOGY

## 2023-08-01 PROCEDURE — 77067 SCR MAMMO BI INCL CAD: CPT | Mod: TC

## 2023-08-01 PROCEDURE — 77067 SCR MAMMO BI INCL CAD: CPT | Mod: 26,,, | Performed by: RADIOLOGY

## 2023-08-01 PROCEDURE — 77063 MAMMO DIGITAL SCREENING BILAT WITH TOMO: ICD-10-PCS | Mod: 26,,, | Performed by: RADIOLOGY

## 2023-09-01 ENCOUNTER — HOSPITAL ENCOUNTER (OUTPATIENT)
Dept: RADIOLOGY | Facility: CLINIC | Age: 63
Discharge: HOME | End: 2023-09-01
Attending: FAMILY MEDICINE
Payer: MEDICAID

## 2023-09-01 DIAGNOSIS — Z13.820 SCREENING FOR OSTEOPOROSIS: ICD-10-CM

## 2023-09-01 DIAGNOSIS — Z78.0 POST-MENOPAUSAL: ICD-10-CM

## 2023-09-01 PROCEDURE — 77080 DXA BONE DENSITY AXIAL: CPT | Mod: TC

## 2023-09-01 PROCEDURE — 77080 DXA BONE DENSITY AXIAL SKELETON 1 OR MORE SITES: ICD-10-PCS | Mod: 26,,, | Performed by: INTERNAL MEDICINE

## 2023-09-01 PROCEDURE — 77080 DXA BONE DENSITY AXIAL: CPT | Mod: 26,,, | Performed by: INTERNAL MEDICINE

## 2023-09-25 DIAGNOSIS — I10 ESSENTIAL HYPERTENSION: ICD-10-CM

## 2023-09-25 DIAGNOSIS — I25.10 CORONARY ARTERY DISEASE INVOLVING NATIVE HEART WITHOUT ANGINA PECTORIS, UNSPECIFIED VESSEL OR LESION TYPE: ICD-10-CM

## 2023-09-25 DIAGNOSIS — I25.10 CORONARY ARTERY DISEASE INVOLVING NATIVE CORONARY ARTERY OF NATIVE HEART WITHOUT ANGINA PECTORIS: ICD-10-CM

## 2023-09-25 DIAGNOSIS — Z72.0 TOBACCO USE: ICD-10-CM

## 2023-09-25 RX ORDER — METOPROLOL SUCCINATE 50 MG/1
50 TABLET, EXTENDED RELEASE ORAL
Qty: 90 TABLET | Refills: 0 | Status: SHIPPED | OUTPATIENT
Start: 2023-09-25 | End: 2023-12-26

## 2023-09-25 RX ORDER — ATORVASTATIN CALCIUM 40 MG/1
40 TABLET, FILM COATED ORAL
Qty: 90 TABLET | Refills: 0 | Status: SHIPPED | OUTPATIENT
Start: 2023-09-25 | End: 2023-12-26

## 2023-09-25 NOTE — TELEPHONE ENCOUNTER
No care due was identified.  [facility] Embedded Care Due Messages. Reference number: [id number].   9/25/2023 5:28:54 AM CDT

## 2023-09-25 NOTE — TELEPHONE ENCOUNTER
Refill Decision Note   Alanna Angelo  is requesting a refill authorization.  Brief Assessment and Rationale for Refill:  Approve     Medication Therapy Plan:         Comments:     Note composed:6:20 PM 09/25/2023

## 2023-10-25 DIAGNOSIS — I10 ESSENTIAL HYPERTENSION: ICD-10-CM

## 2023-10-25 DIAGNOSIS — I25.10 CORONARY ARTERY DISEASE INVOLVING NATIVE HEART WITHOUT ANGINA PECTORIS, UNSPECIFIED VESSEL OR LESION TYPE: ICD-10-CM

## 2023-10-25 DIAGNOSIS — Z79.899 ENCOUNTER FOR MONITORING LONG-TERM PROTON PUMP INHIBITOR THERAPY: ICD-10-CM

## 2023-10-25 DIAGNOSIS — Z51.81 ENCOUNTER FOR MONITORING LONG-TERM PROTON PUMP INHIBITOR THERAPY: ICD-10-CM

## 2023-10-25 DIAGNOSIS — K21.9 GASTROESOPHAGEAL REFLUX DISEASE WITHOUT ESOPHAGITIS: ICD-10-CM

## 2023-10-25 DIAGNOSIS — K22.70 BARRETT'S ESOPHAGUS WITHOUT DYSPLASIA: ICD-10-CM

## 2023-10-25 RX ORDER — LOSARTAN POTASSIUM 100 MG/1
TABLET ORAL
Qty: 90 TABLET | Refills: 0 | Status: SHIPPED | OUTPATIENT
Start: 2023-10-25 | End: 2024-01-23

## 2023-10-25 RX ORDER — OMEPRAZOLE 40 MG/1
CAPSULE, DELAYED RELEASE ORAL
Qty: 90 CAPSULE | Refills: 0 | Status: SHIPPED | OUTPATIENT
Start: 2023-10-25 | End: 2024-01-23

## 2023-10-25 NOTE — TELEPHONE ENCOUNTER
Care Due:                  Date            Visit Type   Department     Provider  --------------------------------------------------------------------------------                                EP -                              PRIMARY      [facility]  Last Visit: 07-      CARE ([facility])   Care           Carmen Devries  Next Visit: None Scheduled  None         None Found                                                            Last  Test          Frequency    Reason                     Performed    Due Date  --------------------------------------------------------------------------------    Office Visit  15 months..  atorvastatin, buPROPion,   07-   10-                             losartan, metoprolol,                             nitroGLYCERIN, omeprazole    [employer] Embedded Care Due Messages. Reference number: [id number].   10/25/2023 5:28:11 AM CDT

## 2024-03-14 ENCOUNTER — TELEPHONE (OUTPATIENT)
Dept: PRIMARY CARE CLINIC | Facility: CLINIC | Age: 64
End: 2024-03-14
Payer: MEDICARE

## 2024-03-14 NOTE — TELEPHONE ENCOUNTER
----- Message from Suad Vegalazaro sent at 3/14/2024 10:45 AM CDT -----  Contact: [phone] Patient  Requesting an RX refill or new RX.  Is this a refill or new RX: new  RX name and strength (copy/paste from chart):  atorvastatin (LIPITOR) 40 MG tablet  Is this a 30 day or 90 day RX:   Pharmacy name and phone # (copy/paste from chart):    [id number]  [place] [address] [facility]  AT [address] [place] [address]  Phone: [phone] Fax: [phone]        Requesting an RX refill or new RX.  Is this a refill or new RX: new  RX name and strength (copy/paste from chart):  metoprolol succinate (TOPROL-XL) 50 MG 24 hr tablet  Is this a 30 day or 90 day RX:   Pharmacy name and phone # (copy/paste from chart):    [id number]  [place] [address]  AT [address] [place] [address]  Phone: [phone] Fax: [phone]    Requesting an RX refill or new RX.  Is this a refill or new RX: new  RX name and strength (copy/paste from chart):  losartan (COZAAR) 100 MG tablet  Is this a 30 day or 90 day RX:   Pharmacy name and phone # (copy/paste from chart):    [facility] #[id number]  [place] [address]  AT [address] [place] [address]  Phone: [phone] Fax: [phone]    Requesting an RX refill or new RX.  Is this a refill or new RX: new  RX name and strength (copy/paste from chart):  omeprazole (PRILOSEC) 40 MG capsule  Is this a 30 day or 90 day RX:   Pharmacy name and phone # (copy/paste from chart):    [facility] #[id number] - [place], [place] - [address]  AT [facility] [address]  [address] [place] [address]  Phone: [phone] Fax: [phone]

## 2024-03-18 ENCOUNTER — TELEPHONE (OUTPATIENT)
Dept: PRIMARY CARE CLINIC | Facility: CLINIC | Age: 64
End: 2024-03-18
Payer: MEDICARE

## 2024-03-19 DIAGNOSIS — I25.10 CORONARY ARTERY DISEASE INVOLVING NATIVE CORONARY ARTERY OF NATIVE HEART WITHOUT ANGINA PECTORIS: ICD-10-CM

## 2024-03-19 DIAGNOSIS — Z79.899 ENCOUNTER FOR MONITORING LONG-TERM PROTON PUMP INHIBITOR THERAPY: ICD-10-CM

## 2024-03-19 DIAGNOSIS — I10 ESSENTIAL HYPERTENSION: ICD-10-CM

## 2024-03-19 DIAGNOSIS — Z72.0 TOBACCO USE: ICD-10-CM

## 2024-03-19 DIAGNOSIS — I25.10 CORONARY ARTERY DISEASE INVOLVING NATIVE HEART WITHOUT ANGINA PECTORIS, UNSPECIFIED VESSEL OR LESION TYPE: ICD-10-CM

## 2024-03-19 DIAGNOSIS — K22.70 BARRETT'S ESOPHAGUS WITHOUT DYSPLASIA: ICD-10-CM

## 2024-03-19 DIAGNOSIS — K21.9 GASTROESOPHAGEAL REFLUX DISEASE WITHOUT ESOPHAGITIS: ICD-10-CM

## 2024-03-19 DIAGNOSIS — Z51.81 ENCOUNTER FOR MONITORING LONG-TERM PROTON PUMP INHIBITOR THERAPY: ICD-10-CM

## 2024-03-19 RX ORDER — METOPROLOL SUCCINATE 50 MG/1
50 TABLET, EXTENDED RELEASE ORAL DAILY
Qty: 30 TABLET | Refills: 0 | Status: SHIPPED | OUTPATIENT
Start: 2024-03-19 | End: 2024-03-26 | Stop reason: SDUPTHER

## 2024-03-19 RX ORDER — ATORVASTATIN CALCIUM 40 MG/1
40 TABLET, FILM COATED ORAL DAILY
Qty: 30 TABLET | Refills: 0 | Status: SHIPPED | OUTPATIENT
Start: 2024-03-19 | End: 2024-03-26 | Stop reason: SDUPTHER

## 2024-03-19 RX ORDER — LOSARTAN POTASSIUM 100 MG/1
100 TABLET ORAL DAILY
Qty: 30 TABLET | Refills: 0 | Status: SHIPPED | OUTPATIENT
Start: 2024-03-19 | End: 2024-03-26 | Stop reason: SDUPTHER

## 2024-03-19 RX ORDER — OMEPRAZOLE 40 MG/1
40 CAPSULE, DELAYED RELEASE ORAL DAILY
Qty: 30 CAPSULE | Refills: 0 | Status: SHIPPED | OUTPATIENT
Start: 2024-03-19 | End: 2024-03-26 | Stop reason: SDUPTHER

## 2024-03-19 NOTE — TELEPHONE ENCOUNTER
No care due was identified.  [facility] Embedded Care Due Messages. Reference number: [id number].   3/19/2024 4:00:37 PM CDT

## 2024-03-19 NOTE — TELEPHONE ENCOUNTER
----- Message from Shady Parisi sent at 3/19/2024  9:33 AM CDT -----  Contact: [phone]  Requesting an RX refill or new RX.  Is this a refill or new RX: refill  RX name and strength (copy/paste from chart):  metoprolol succinate (TOPROL-XL) 50 MG 24 hr tablet  Is this a 30 day or 90 day RX:   Pharmacy name and phone # (copy/paste from chart):    [id number] [place] [facility]  AT [address] [place] [address]  Phone: [phone] Fax: [phone]  The doctors have asked that we provide their patients with the following 2 reminders -- prescription refills can take up to 72 hours, and a friendly reminder that in the future you can use your [facility] account to request refills: call back     Requesting an RX refill or new RX.  Is this a refill or new RX: refill  RX name and strength (copy/paste from chart):  atorvastatin (LIPITOR) 40 MG tablet  Is this a 30 day or 90 day RX:   Pharmacy name and phone # (copy/paste from chart):    [id number] [place] [address] [place] [address]  AT [address] [place] [address]  Phone: [phone] Fax: [phone]  The doctors have asked that we provide their patients with the following 2 reminders -- prescription refills can take up to 72 hours, and a friendly reminder that in the future you can use your [facility] account to request refills: call back     Requesting an RX refill or new RX.  Is this a refill or new RX: refill  RX name and strength (copy/paste from chart):  losartan (COZAAR) 100 MG tablet  Is this a 30 day or 90 day RX:   Pharmacy name and phone # (copy/paste from chart):    [facility] #[id number] [place] [facility] AT [address] [place] [address]  Phone: [phone] Fax: [phone]  The doctors have asked that we provide their patients with the following 2 reminders -- prescription  refills can take up to 72 hours, and a friendly reminder that in the future you can use your [facility] account to request refills: call back     Requesting an RX refill or new RX.  Is this a refill or new RX: refill  RX name and strength (copy/paste from chart):  omeprazole (PRILOSEC) 40 MG capsule  Is this a 30 day or 90 day RX:   Pharmacy name and phone # (copy/paste from chart):   [facility] #[id number] - [place] [facility]  AT [facility] [address]  [address] [place] [address]  Phone: [phone] Fax: [phone]    The doctors have asked that we provide their patients with the following 2 reminders -- prescription refills can take up to 72 hours, and a friendly reminder that in the future you can use your [facility] account to request refills: call back     Please call pt back

## 2024-03-20 NOTE — TELEPHONE ENCOUNTER
Pt would like to know why the Metoprolol and Atorvastatin has been denied, pt state the pharmacy told her the prescription has been denied but not why the have been denied. Pt is very upset that it is not being communicated with her her prescription have been denied because she has been calling for three day and she not gotten any answers. Pt states she will show up at the clinic and demand to speak to someone regarding why her medications have been denied. Spoke with pharmacy and they have received the prescriptions and will give the patient a call.

## 2024-03-20 NOTE — TELEPHONE ENCOUNTER
----- Message from Cameron Mireles sent at 3/20/2024  8:24 AM CDT -----  Contact: pt @ [phone]  1MEDICALADVICE     Patient is calling for Medical Advice regarding: pt would like to know why her Rx was denied    How long has patient had these symptoms:    [facility] phone#:  [id number] [place] - [address]  AT [facility] [address]  [address] [place] [address]  Phone: [phone] Fax: [phone]       Would like response via [facility]:  call     Comments:metoprolol succinate (TOPROL-XL) 50 MG 24 hr tablet

## 2024-03-26 ENCOUNTER — TELEPHONE (OUTPATIENT)
Dept: PULMONOLOGY | Facility: CLINIC | Age: 64
End: 2024-03-26
Payer: MEDICARE

## 2024-03-26 ENCOUNTER — LAB VISIT (OUTPATIENT)
Dept: LAB | Facility: HOSPITAL | Age: 64
End: 2024-03-26
Attending: FAMILY MEDICINE
Payer: MEDICARE

## 2024-03-26 ENCOUNTER — OFFICE VISIT (OUTPATIENT)
Dept: PRIMARY CARE CLINIC | Facility: CLINIC | Age: 64
End: 2024-03-26
Payer: MEDICARE

## 2024-03-26 VITALS
RESPIRATION RATE: 18 BRPM | TEMPERATURE: 98 F | BODY MASS INDEX: 26.21 KG/M2 | SYSTOLIC BLOOD PRESSURE: 148 MMHG | OXYGEN SATURATION: 98 % | DIASTOLIC BLOOD PRESSURE: 92 MMHG | HEART RATE: 68 BPM | HEIGHT: 62 IN | WEIGHT: 142.44 LBS

## 2024-03-26 DIAGNOSIS — Z72.0 TOBACCO USE: ICD-10-CM

## 2024-03-26 DIAGNOSIS — I25.10 CORONARY ARTERY DISEASE INVOLVING NATIVE CORONARY ARTERY OF NATIVE HEART WITHOUT ANGINA PECTORIS: ICD-10-CM

## 2024-03-26 DIAGNOSIS — Z23 NEED FOR PNEUMOCOCCAL 20-VALENT CONJUGATE VACCINATION: ICD-10-CM

## 2024-03-26 DIAGNOSIS — Z51.81 ENCOUNTER FOR MONITORING LONG-TERM PROTON PUMP INHIBITOR THERAPY: ICD-10-CM

## 2024-03-26 DIAGNOSIS — R91.8 MULTIPLE LUNG NODULES: ICD-10-CM

## 2024-03-26 DIAGNOSIS — F41.8 SITUATIONAL ANXIETY: ICD-10-CM

## 2024-03-26 DIAGNOSIS — R82.90 ABNORMAL URINE ODOR: ICD-10-CM

## 2024-03-26 DIAGNOSIS — J40 BRONCHITIS: ICD-10-CM

## 2024-03-26 DIAGNOSIS — I70.0 ATHEROSCLEROSIS OF AORTA: ICD-10-CM

## 2024-03-26 DIAGNOSIS — R06.02 SOB (SHORTNESS OF BREATH): Primary | ICD-10-CM

## 2024-03-26 DIAGNOSIS — Z79.899 ENCOUNTER FOR MONITORING LONG-TERM PROTON PUMP INHIBITOR THERAPY: ICD-10-CM

## 2024-03-26 DIAGNOSIS — J43.2 CENTRILOBULAR EMPHYSEMA: ICD-10-CM

## 2024-03-26 DIAGNOSIS — I10 ESSENTIAL HYPERTENSION: Primary | ICD-10-CM

## 2024-03-26 DIAGNOSIS — K22.70 BARRETT'S ESOPHAGUS WITHOUT DYSPLASIA: ICD-10-CM

## 2024-03-26 DIAGNOSIS — K21.9 GASTROESOPHAGEAL REFLUX DISEASE WITHOUT ESOPHAGITIS: ICD-10-CM

## 2024-03-26 DIAGNOSIS — Z80.0 FAMILY HISTORY OF ESOPHAGEAL CANCER: ICD-10-CM

## 2024-03-26 DIAGNOSIS — R79.89 LOW VITAMIN B12 LEVEL: ICD-10-CM

## 2024-03-26 LAB
AMORPH CRY UR QL COMP ASSIST: ABNORMAL
BACTERIA #/AREA URNS AUTO: ABNORMAL /HPF
BILIRUB UR QL STRIP: NEGATIVE
CLARITY UR REFRACT.AUTO: ABNORMAL
COLOR UR AUTO: YELLOW
GLUCOSE UR QL STRIP: NEGATIVE
HGB UR QL STRIP: ABNORMAL
KETONES UR QL STRIP: NEGATIVE
LEUKOCYTE ESTERASE UR QL STRIP: ABNORMAL
MICROSCOPIC COMMENT: ABNORMAL
NITRITE UR QL STRIP: POSITIVE
PH UR STRIP: 7 [PH] (ref 5–8)
PROT UR QL STRIP: NEGATIVE
RBC #/AREA URNS AUTO: 1 /HPF (ref 0–4)
SP GR UR STRIP: 1.01 (ref 1–1.03)
SQUAMOUS #/AREA URNS AUTO: 1 /HPF
URN SPEC COLLECT METH UR: ABNORMAL
WBC #/AREA URNS AUTO: 5 /HPF (ref 0–5)

## 2024-03-26 PROCEDURE — 87086 URINE CULTURE/COLONY COUNT: CPT | Performed by: FAMILY MEDICINE

## 2024-03-26 PROCEDURE — 87077 CULTURE AEROBIC IDENTIFY: CPT | Performed by: FAMILY MEDICINE

## 2024-03-26 PROCEDURE — 87186 SC STD MICRODIL/AGAR DIL: CPT | Performed by: FAMILY MEDICINE

## 2024-03-26 PROCEDURE — 81001 URINALYSIS AUTO W/SCOPE: CPT | Performed by: FAMILY MEDICINE

## 2024-03-26 PROCEDURE — 99214 OFFICE O/P EST MOD 30 MIN: CPT | Mod: S$GLB,,, | Performed by: FAMILY MEDICINE

## 2024-03-26 PROCEDURE — 99999 PR PBB SHADOW E&M-EST. PATIENT-LVL V: CPT | Mod: PBBFAC,,, | Performed by: FAMILY MEDICINE

## 2024-03-26 PROCEDURE — 87088 URINE BACTERIA CULTURE: CPT | Performed by: FAMILY MEDICINE

## 2024-03-26 RX ORDER — ALBUTEROL SULFATE 90 UG/1
2 AEROSOL, METERED RESPIRATORY (INHALATION) EVERY 6 HOURS PRN
Qty: 18 G | Refills: 1 | Status: SHIPPED | OUTPATIENT
Start: 2024-03-26 | End: 2024-05-14

## 2024-03-26 RX ORDER — AMOXICILLIN AND CLAVULANATE POTASSIUM 875; 125 MG/1; MG/1
1 TABLET, FILM COATED ORAL EVERY 12 HOURS
Qty: 20 TABLET | Refills: 0 | Status: SHIPPED | OUTPATIENT
Start: 2024-03-26 | End: 2024-04-05

## 2024-03-26 RX ORDER — METOPROLOL SUCCINATE 50 MG/1
50 TABLET, EXTENDED RELEASE ORAL DAILY
Qty: 90 TABLET | Refills: 3 | Status: SHIPPED | OUTPATIENT
Start: 2024-03-26 | End: 2024-04-18

## 2024-03-26 RX ORDER — ATORVASTATIN CALCIUM 40 MG/1
40 TABLET, FILM COATED ORAL DAILY
Qty: 90 TABLET | Refills: 3 | Status: SHIPPED | OUTPATIENT
Start: 2024-03-26 | End: 2024-04-18

## 2024-03-26 RX ORDER — LOSARTAN POTASSIUM 100 MG/1
100 TABLET ORAL DAILY
Qty: 90 TABLET | Refills: 3 | Status: SHIPPED | OUTPATIENT
Start: 2024-03-26

## 2024-03-26 RX ORDER — FLUOXETINE 10 MG/1
10 CAPSULE ORAL DAILY
Qty: 30 CAPSULE | Refills: 11 | Status: SHIPPED | OUTPATIENT
Start: 2024-03-26 | End: 2025-03-26

## 2024-03-26 RX ORDER — OMEPRAZOLE 40 MG/1
40 CAPSULE, DELAYED RELEASE ORAL DAILY
Qty: 90 CAPSULE | Refills: 3 | Status: SHIPPED | OUTPATIENT
Start: 2024-03-26

## 2024-03-26 NOTE — PROGRESS NOTES
"Subjective:       Patient ID: Alanna Angelo is a 64 y.o. female.    Chief Complaint: Annual Exam       HPI 63 y/o female smoker, with CAD s/p PTCA 2008, HTN, Nguyen's esophagus, Low B12, Multiple Lung nodules, Emphysema is here for HTN follow up.     She is feeling stressed, she had a family member pass away and is overwhelmed financially, she denies f/n/v/d/constipation/cp/sob, she has been coughing for 2 weeks, its mostly dry cough sometimes yellow mucus comes up, some runny nose, using mucinex helps, feels like she wheezes sometimes. Her urine has an odor x 1 week, she denies dysuria/hematuria/frequency, she is not drinking much water, drinks mainly dr. Pepper. Smoking 1/2 ppd. Appetite ok. Sleeping poor, melatonin not helpful. She is active at work.      HTN: Toprol XL 50 mg daily, Losartan 100 mg daily  GERD/Barretts: EGD 2/2023 repeat 3 years, omeprazole 40 mg daily  Low B12: off injections for over a year  Emphysema/Lung nodules: following with pulmonary, PFTs ok 7/2019, CT chest 8/2022  GYN; hx of L breast biopsy benign 2001, mmg 8/2023, dexa 9/2023 normal, pelvic due  CAD/aortic atherosclerosis: following with cardiology, on bb, baby asa, statin; off Plavix   Poor sleep: melatonin not helpful  Tobacco use:1/2 ppd  Colonoscopy: 6/2018 repeat 10 years  Eye exam due  Dental utd    Review of Systems  see HPI  Objective:      BP (!) 148/92 (BP Location: Right arm, Patient Position: Sitting, BP Method: Small (Manual))   Pulse 68   Temp 98.2 °F (36.8 °C) (Oral)   Resp 18   Ht 5' 1.52" (1.563 m)   Wt 64.6 kg (142 lb 6.7 oz)   SpO2 98%   BMI 26.45 kg/m²   Physical Exam  Vitals and nursing note reviewed.   Constitutional:       Appearance: She is well-developed.   HENT:      Head: Normocephalic and atraumatic.      Mouth/Throat:      Pharynx: No oropharyngeal exudate or posterior oropharyngeal erythema.   Neck:      Thyroid: No thyromegaly.   Cardiovascular:      Rate and Rhythm: Normal rate and " regular rhythm.      Heart sounds: Normal heart sounds.   Pulmonary:      Effort: Pulmonary effort is normal. No respiratory distress.      Breath sounds: Wheezing present.   Abdominal:      General: Bowel sounds are normal. There is no distension.      Palpations: Abdomen is soft. There is no mass.      Tenderness: There is no abdominal tenderness.   Musculoskeletal:      Cervical back: Normal range of motion and neck supple.      Right lower leg: No edema.      Left lower leg: No edema.   Lymphadenopathy:      Cervical: No cervical adenopathy.   Skin:     General: Skin is warm and dry.   Neurological:      Mental Status: She is alert.         Assessment:       1. Essential hypertension    2. Centrilobular emphysema    3. Multiple lung nodules    4. Tobacco use    5. Atherosclerosis of aorta    6. Gastroesophageal reflux disease without esophagitis    7. Low vitamin B12 level    8. Family history of esophageal cancer    9. Encounter for monitoring long-term proton pump inhibitor therapy    10. Coronary artery disease involving native coronary artery of native heart without angina pectoris    11. Nguyen's esophagus without dysplasia, repeat EGD 6/2021    12. Bronchitis    13. Situational anxiety    14. Need for pneumococcal 20-valent conjugate vaccination    15. Abnormal urine odor        Plan:   Alanna was seen today for annual exam.    Diagnoses and all orders for this visit:    Essential hypertension  -     atorvastatin (LIPITOR) 40 MG tablet; Take 1 tablet (40 mg total) by mouth once daily.  -     losartan (COZAAR) 100 MG tablet; Take 1 tablet (100 mg total) by mouth once daily.  -     metoprolol succinate (TOPROL-XL) 50 MG 24 hr tablet; Take 1 tablet (50 mg total) by mouth once daily.  -     Comprehensive Metabolic Panel; Future  -     Ambulatory referral/consult to Cardiology; Future    Centrilobular emphysema  -     CT Chest Without Contrast; Future  -     Ambulatory referral/consult to Pulmonology;  Future  -     amoxicillin-clavulanate 875-125mg (AUGMENTIN) 875-125 mg per tablet; Take 1 tablet by mouth every 12 (twelve) hours. for 10 days  -     albuterol (VENTOLIN HFA) 90 mcg/actuation inhaler; Inhale 2 puffs into the lungs every 6 (six) hours as needed for Wheezing. Rescue    Multiple lung nodules  -     CT Chest Without Contrast; Future  -     Ambulatory referral/consult to Pulmonology; Future    Tobacco use  -     CT Chest Without Contrast; Future  -     atorvastatin (LIPITOR) 40 MG tablet; Take 1 tablet (40 mg total) by mouth once daily.  -     Ambulatory referral/consult to Pulmonology; Future  -     amoxicillin-clavulanate 875-125mg (AUGMENTIN) 875-125 mg per tablet; Take 1 tablet by mouth every 12 (twelve) hours. for 10 days  -     albuterol (VENTOLIN HFA) 90 mcg/actuation inhaler; Inhale 2 puffs into the lungs every 6 (six) hours as needed for Wheezing. Rescue    Atherosclerosis of aorta  -     Ambulatory referral/consult to Cardiology; Future    Gastroesophageal reflux disease without esophagitis  -     omeprazole (PRILOSEC) 40 MG capsule; Take 1 capsule (40 mg total) by mouth once daily.    Low vitamin B12 level    Family history of esophageal cancer    Encounter for monitoring long-term proton pump inhibitor therapy  -     omeprazole (PRILOSEC) 40 MG capsule; Take 1 capsule (40 mg total) by mouth once daily.    Coronary artery disease involving native coronary artery of native heart without angina pectoris    Nguyen's esophagus without dysplasia, repeat EGD 6/2021  -     omeprazole (PRILOSEC) 40 MG capsule; Take 1 capsule (40 mg total) by mouth once daily.    Bronchitis  -     amoxicillin-clavulanate 875-125mg (AUGMENTIN) 875-125 mg per tablet; Take 1 tablet by mouth every 12 (twelve) hours. for 10 days  -     albuterol (VENTOLIN HFA) 90 mcg/actuation inhaler; Inhale 2 puffs into the lungs every 6 (six) hours as needed for Wheezing. Rescue    Situational anxiety  -     FLUoxetine 10 MG capsule;  Take 1 capsule (10 mg total) by mouth once daily.    Need for pneumococcal 20-valent conjugate vaccination  -     (In Office Administered) Pneumococcal Conjugate Vaccine (20 Valent) (IM) (Preferred)    Abnormal urine odor  -     Urinalysis; Future  -     Urine culture; Future

## 2024-03-27 ENCOUNTER — TELEPHONE (OUTPATIENT)
Dept: PRIMARY CARE CLINIC | Facility: CLINIC | Age: 64
End: 2024-03-27
Payer: MEDICARE

## 2024-03-27 DIAGNOSIS — N30.01 ACUTE CYSTITIS WITH HEMATURIA: Primary | ICD-10-CM

## 2024-03-27 RX ORDER — NITROFURANTOIN 25; 75 MG/1; MG/1
100 CAPSULE ORAL 2 TIMES DAILY
Qty: 14 CAPSULE | Refills: 0 | Status: SHIPPED | OUTPATIENT
Start: 2024-03-27 | End: 2024-04-03

## 2024-03-28 NOTE — TELEPHONE ENCOUNTER
----- Message from Carmen Devries MD sent at 3/27/2024  4:42 PM CDT -----  Your urinalysis looks like you probably have a UTI, I will send an antibiotic to your pharmacy and let you know when the culture returned.  Please let us know if you need anything.

## 2024-03-28 NOTE — TELEPHONE ENCOUNTER
Spoke with pt re: UA results. Pt aware an expresses understanding.    Will start antibiotic now, we will be in touch once culture results.

## 2024-03-29 LAB — BACTERIA UR CULT: ABNORMAL

## 2024-04-01 ENCOUNTER — HOSPITAL ENCOUNTER (OUTPATIENT)
Dept: RADIOLOGY | Facility: HOSPITAL | Age: 64
Discharge: HOME | End: 2024-04-01
Attending: FAMILY MEDICINE
Payer: MEDICARE

## 2024-04-01 DIAGNOSIS — R91.8 MULTIPLE LUNG NODULES: ICD-10-CM

## 2024-04-01 DIAGNOSIS — Z72.0 TOBACCO USE: ICD-10-CM

## 2024-04-01 DIAGNOSIS — J43.2 CENTRILOBULAR EMPHYSEMA: ICD-10-CM

## 2024-04-01 PROCEDURE — 71250 CT THORAX DX C-: CPT | Mod: 26,,, | Performed by: STUDENT IN AN ORGANIZED HEALTH CARE EDUCATION/TRAINING PROGRAM

## 2024-04-01 PROCEDURE — 71250 CT THORAX DX C-: CPT | Mod: TC

## 2024-04-17 ENCOUNTER — TELEPHONE (OUTPATIENT)
Dept: PRIMARY CARE CLINIC | Facility: CLINIC | Age: 64
End: 2024-04-17
Payer: MEDICARE

## 2024-04-17 NOTE — TELEPHONE ENCOUNTER
----- Message from Yolis Navarrete sent at 4/17/2024 12:02 PM CDT -----  Contact: [phone]  Requesting an RX refill or new RX.  Is this a refill or new RX:  Refill   RX name and strength    atorvastatin (LIPITOR) 40 MG tablet  metoprolol succinate (TOPROL-XL) 50 MG 24 hr tablet  losartan (COZAAR) 100 MG tablet  omeprazole (PRILOSEC) 40 MG capsule    Is this a 30 day or 90 day RX: 90    Pharmacy name and phone #   [id number] [place] - [address]  AT [facility] [address]  [address] [place] [address]  Phone: [phone] Fax: [phone]

## 2024-05-08 ENCOUNTER — OFFICE VISIT (OUTPATIENT)
Dept: CARDIOLOGY | Facility: CLINIC | Age: 64
End: 2024-05-08
Payer: MEDICARE

## 2024-05-08 VITALS
OXYGEN SATURATION: 97 % | DIASTOLIC BLOOD PRESSURE: 80 MMHG | HEART RATE: 73 BPM | SYSTOLIC BLOOD PRESSURE: 150 MMHG | BODY MASS INDEX: 27.26 KG/M2 | WEIGHT: 144.38 LBS | HEIGHT: 61 IN

## 2024-05-08 DIAGNOSIS — I70.0 ATHEROSCLEROSIS OF AORTA: ICD-10-CM

## 2024-05-08 DIAGNOSIS — I10 ESSENTIAL HYPERTENSION: ICD-10-CM

## 2024-05-08 DIAGNOSIS — Z72.0 TOBACCO USE: ICD-10-CM

## 2024-05-08 PROCEDURE — 4010F ACE/ARB THERAPY RXD/TAKEN: CPT | Mod: CPTII,GC,S$GLB, | Performed by: INTERNAL MEDICINE

## 2024-05-08 PROCEDURE — 3288F FALL RISK ASSESSMENT DOCD: CPT | Mod: CPTII,GC,S$GLB, | Performed by: INTERNAL MEDICINE

## 2024-05-08 PROCEDURE — 1101F PT FALLS ASSESS-DOCD LE1/YR: CPT | Mod: CPTII,GC,S$GLB, | Performed by: INTERNAL MEDICINE

## 2024-05-08 PROCEDURE — 3008F BODY MASS INDEX DOCD: CPT | Mod: CPTII,GC,S$GLB, | Performed by: INTERNAL MEDICINE

## 2024-05-08 PROCEDURE — 3079F DIAST BP 80-89 MM HG: CPT | Mod: CPTII,GC,S$GLB, | Performed by: INTERNAL MEDICINE

## 2024-05-08 PROCEDURE — 99999 PR PBB SHADOW E&M-EST. PATIENT-LVL IV: CPT | Mod: PBBFAC,GC,, | Performed by: INTERNAL MEDICINE

## 2024-05-08 PROCEDURE — 3077F SYST BP >= 140 MM HG: CPT | Mod: CPTII,GC,S$GLB, | Performed by: INTERNAL MEDICINE

## 2024-05-08 PROCEDURE — 99204 OFFICE O/P NEW MOD 45 MIN: CPT | Mod: GC,S$GLB,, | Performed by: INTERNAL MEDICINE

## 2024-05-08 RX ORDER — ATORVASTATIN CALCIUM 80 MG/1
80 TABLET, FILM COATED ORAL DAILY
Qty: 90 TABLET | Refills: 3 | Status: SHIPPED | OUTPATIENT
Start: 2024-05-08

## 2024-05-08 NOTE — PATIENT INSTRUCTIONS
Increase atorvastatin to 80 mg daily     Keep blood pressure log and bring to next primary care appointment    Continue efforts at smoking cessation     Follow up in 1 year

## 2024-05-08 NOTE — PROGRESS NOTES
PCP - Carmen Devries MD  Subjective:     Alanna Angelo is a 64 y.o. female with PMH of HTN, emphysema, aortic atherosclerosis, h/o CAD with remote PCI in  who presents to cardiology clinic for new patient visit. She has not been seen by cardiology for some time. She reports she is asymptomatic from a cardiovascular standpoint. No exertional dyspnea or chest discomfort. No orthopnea, PND, lower extremity edema. She takes an aspirin and statin daily. Uncertain which vessel underwent PCI in the past. She is a former smoker but has made significant efforts to quit over the past few months. She has maybe bought one pack of cigarettes in the last month or two.       ---------------  Cardiac Studies ---------------        History:     Social History     Tobacco Use    Smoking status: Former     Current packs/day: 0.00     Average packs/day: 1 pack/day for 48.0 years (48.0 ttl pk-yrs)     Types: Cigarettes     Start date: 1975     Quit date: 2022     Years since quittin.4    Smokeless tobacco: Never    Tobacco comments:     Stopped smoking for 5 or 6 weeks now.   Substance Use Topics    Alcohol use: No     Family History   Problem Relation Name Age of Onset    Cataracts Mother      Rheum arthritis Mother      Stroke Father      Heart disease Father      Hypertension Father      Hyperlipidemia Father      Cancer Father          esophageal    Tuberculosis Father      Hypertension Brother      Hyperlipidemia Brother      Heart disease Brother      Diabetes Neg Hx      Asthma Neg Hx      Emphysema Neg Hx         Meds:   Review of patient's allergies indicates:  No Known Allergies    Current Outpatient Medications:     aspirin (ECOTRIN) 81 MG EC tablet, Take 81 mg by mouth once daily., Disp: , Rfl:     cholecalciferol, vitamin D3, (VITAMIN D3) 50 mcg (2,000 unit) Cap capsule, Take 1 capsule by mouth once daily., Disp: , Rfl:     FLUoxetine 10 MG capsule, Take 1 capsule (10 mg total) by mouth once  "daily., Disp: 30 capsule, Rfl: 11    losartan (COZAAR) 100 MG tablet, Take 1 tablet (100 mg total) by mouth once daily., Disp: 90 tablet, Rfl: 3    metoprolol succinate (TOPROL-XL) 50 MG 24 hr tablet, TAKE 1 TABLET(50 MG) BY MOUTH DAILY, Disp: 90 tablet, Rfl: 3    nitroGLYCERIN (NITROSTAT) 0.4 MG SL tablet, DISSOLVE 1 TABLET UNDER THE TONGUE EVERY 5 MINUTES AS NEEDED FOR CHEST PAIN, Disp: 25 tablet, Rfl: 11    omega-3 fatty acids-fish oil (FISH OIL) 360-1,200 mg CpDR, Take 2 capsules by mouth once daily., Disp: , Rfl:     omeprazole (PRILOSEC) 40 MG capsule, Take 1 capsule (40 mg total) by mouth once daily., Disp: 90 capsule, Rfl: 3    albuterol (PROVENTIL/VENTOLIN HFA) 90 mcg/actuation inhaler, INHALE 2 PUFFS INTO THE LUNGS EVERY 6 HOURS AS NEEDED FOR WHEEZING( RESCUE), Disp: 54 g, Rfl: 1    atorvastatin (LIPITOR) 80 MG tablet, Take 1 tablet (80 mg total) by mouth once daily., Disp: 90 tablet, Rfl: 3    10 point ROS performed and negative except as stated in HPI     Objective:   BP (!) 150/80   Pulse 73   Ht 5' 1" (1.549 m)   Wt 65.5 kg (144 lb 6.4 oz)   SpO2 97%   BMI 27.28 kg/m²     Physical Exam:   Constitutional: no acute distress  HEENT: NCAT, EOMI, no scleral icterus  Cardiovascular: Regular rate and rhythm, no murmurs appreciated. 2+ carotid, radial, DP pulses bilaterally    Pulmonary: Clear to auscultation bilaterally   Abdomen: nontender, non-distended   Neuro: alert and oriented, no focal deficits  Extremities: warm, no edema   MSK: no deformities  Integument: intact, no rashes       Labs:     Lab Results   Component Value Date     03/26/2024    K 4.5 03/26/2024     03/26/2024    CO2 26 03/26/2024    BUN 10 03/26/2024    CREATININE 0.8 03/26/2024    ANIONGAP 8 03/26/2024     Lab Results   Component Value Date    HGBA1C 4.7 09/01/2023     No results found for: "BNP", "BNPTRIAGEBLO"    Lab Results   Component Value Date    WBC 5.07 09/01/2023    HGB 11.9 (L) 09/01/2023    HCT 37.6 " "09/01/2023     09/01/2023    GRAN 2.8 09/01/2023    GRAN 56.0 09/01/2023     Lab Results   Component Value Date    CHOL 128 09/01/2023    HDL 34 (L) 09/01/2023    LDLCALC 76.0 09/01/2023    TRIG 90 09/01/2023       Lab Results   Component Value Date     03/26/2024    K 4.5 03/26/2024     03/26/2024    CO2 26 03/26/2024    BUN 10 03/26/2024    CREATININE 0.8 03/26/2024    ANIONGAP 8 03/26/2024     Lab Results   Component Value Date    HGBA1C 4.7 09/01/2023     No results found for: "BNP", "BNPTRIAGEBLO" Lab Results   Component Value Date    WBC 5.07 09/01/2023    HGB 11.9 (L) 09/01/2023    HCT 37.6 09/01/2023     09/01/2023    GRAN 2.8 09/01/2023    GRAN 56.0 09/01/2023     Lab Results   Component Value Date    CHOL 128 09/01/2023    HDL 34 (L) 09/01/2023    LDLCALC 76.0 09/01/2023    TRIG 90 09/01/2023            Assessment     1. Atherosclerosis of aorta    2. Essential hypertension    3. Tobacco use        Plan:   Alanna Angelo is a 64 y.o. female with PMH of HTN, emphysema, aortic atherosclerosis, h/o CAD with remote PCI in 2008 who presents to cardiology clinic for new patient visit.    CAD: Asymptomatic, no need for further workup at this time. Manage risk factors. Continue aspirin. Increase statin as below.     HTN: BP is elevated today but historically well controlled. Advised to keep a log and bring to next primary care appt.     HLD: Last LDL above goal at 76. Increase atorvastatin from 40 to 80 mg daily.       Arnaldo Oneill MD  [facility]  Cardiovascular Disease, PGY-VI         "

## 2024-05-13 DIAGNOSIS — J40 BRONCHITIS: ICD-10-CM

## 2024-05-13 DIAGNOSIS — J43.2 CENTRILOBULAR EMPHYSEMA: ICD-10-CM

## 2024-05-13 DIAGNOSIS — Z72.0 TOBACCO USE: ICD-10-CM

## 2024-05-13 NOTE — TELEPHONE ENCOUNTER
No care due was identified.  [facility] Embedded Care Due Messages. Reference number: [id number].   5/13/2024 12:21:01 PM CDT

## 2024-05-13 NOTE — TELEPHONE ENCOUNTER
Refill Routing Note   Medication(s) are not appropriate for processing by [facility] for the following reason(s):        New or recently adjusted medication    [facility] action(s):  Defer               Appointments  past 12m or future 3m with PCP    Date Provider   Last Visit   3/26/2024 Carmen Devries MD   Next Visit   Visit date not found Carmen Devries MD   ED visits in past 90 days: 0        Note composed:4:12 PM 05/13/2024

## 2024-05-14 RX ORDER — ALBUTEROL SULFATE 90 UG/1
AEROSOL, METERED RESPIRATORY (INHALATION)
Qty: 54 G | Refills: 1 | Status: SHIPPED | OUTPATIENT
Start: 2024-05-14

## 2024-05-24 ENCOUNTER — HOSPITAL ENCOUNTER (OUTPATIENT)
Dept: PULMONOLOGY | Facility: CLINIC | Age: 64
Discharge: HOME | End: 2024-05-24
Payer: MEDICARE

## 2024-05-24 ENCOUNTER — OFFICE VISIT (OUTPATIENT)
Dept: PULMONOLOGY | Facility: CLINIC | Age: 64
End: 2024-05-24
Payer: MEDICARE

## 2024-05-24 VITALS
BODY MASS INDEX: 28.35 KG/M2 | DIASTOLIC BLOOD PRESSURE: 82 MMHG | HEIGHT: 60 IN | WEIGHT: 144.38 LBS | HEART RATE: 67 BPM | BODY MASS INDEX: 28.35 KG/M2 | OXYGEN SATURATION: 98 % | HEIGHT: 60 IN | SYSTOLIC BLOOD PRESSURE: 178 MMHG | WEIGHT: 144.38 LBS

## 2024-05-24 DIAGNOSIS — Z72.0 TOBACCO USE: ICD-10-CM

## 2024-05-24 DIAGNOSIS — R06.02 SOB (SHORTNESS OF BREATH): ICD-10-CM

## 2024-05-24 DIAGNOSIS — J43.2 CENTRILOBULAR EMPHYSEMA: ICD-10-CM

## 2024-05-24 DIAGNOSIS — R91.8 MULTIPLE LUNG NODULES: ICD-10-CM

## 2024-05-24 LAB
DLCO SINGLE BREATH LLN: 13.8
DLCO SINGLE BREATH PRE REF: 50.7 %
DLCO SINGLE BREATH REF: 19.53
DLCOC SBVA LLN: 2.86
DLCOC SBVA REF: 4.58
DLCOC SINGLE BREATH LLN: 13.8
DLCOC SINGLE BREATH REF: 19.53
DLCOCSBVAULN: 6.29
DLCOCSINGLEBREATHULN: 25.27
DLCOSINGLEBREATHULN: 25.27
DLCOVA LLN: 2.86
DLCOVA PRE REF: 64.1 %
DLCOVA PRE: 2.93 ML/(MIN*MMHG*L) (ref 2.86–6.29)
DLCOVA REF: 4.58
DLCOVAULN: 6.29
ERV LLN: -16449.32
ERV PRE REF: 70.2 %
ERV REF: 0.68
ERVULN: ABNORMAL
FEF 25 75 LLN: 0.91
FEF 25 75 PRE REF: 73.6 %
FEF 25 75 REF: 1.88
FEV05 LLN: 0.72
FEV05 REF: 1.58
FEV1 FVC LLN: 67
FEV1 FVC PRE REF: 100.6 %
FEV1 FVC REF: 79
FEV1 LLN: 1.52
FEV1 PRE REF: 76.8 %
FEV1 REF: 2.05
FRCPLETH LLN: 1.66
FRCPLETH PREREF: 105.6 %
FRCPLETH REF: 2.48
FRCPLETHULN: 3.3
FVC LLN: 1.93
FVC PRE REF: 76 %
FVC REF: 2.6
IVC PRE: 1.93 L (ref 1.93–3.29)
IVC SINGLE BREATH LLN: 1.93
IVC SINGLE BREATH PRE REF: 74.3 %
IVC SINGLE BREATH REF: 2.6
IVCSINGLEBREATHULN: 3.29
LLN IC: -16448.42
PEF LLN: 3.9
PEF PRE REF: 73.8 %
PEF REF: 5.4
PHYSICIAN COMMENT: ABNORMAL
PRE DLCO: 9.9 ML/(MIN*MMHG) (ref 13.8–25.27)
PRE ERV: 0.48 L (ref -16449.32–16450.68)
PRE FEF 25 75: 1.38 L/S (ref 0.91–3.21)
PRE FET 100: 6.99 SEC
PRE FEV05 REF: 79.2 %
PRE FEV1 FVC: 79.74 % (ref 66.54–90.18)
PRE FEV1: 1.57 L (ref 1.52–2.56)
PRE FEV5: 1.25 L (ref 0.72–2.44)
PRE FRC PL: 2.62 L (ref 1.66–3.3)
PRE FVC: 1.97 L (ref 1.93–3.29)
PRE IC: 1.5 L (ref -16448.42–16451.58)
PRE PEF: 3.98 L/S (ref 3.9–6.9)
PRE REF IC: 94.6 %
PRE RV: 2.14 L (ref 1.22–2.37)
PRE TLC: 4.11 L (ref 3.28–5.26)
RAW PRE REF: 196.7 %
RAW PRE: 6.02 CMH2O*S/L (ref 3.06–3.06)
RAW REF: 3.06
REF IC: 1.58
RV LLN: 1.22
RV PRE REF: 119 %
RV REF: 1.8
RVTLC LLN: 31
RVTLC PRE REF: 126.7 %
RVTLC PRE: 52.02 % (ref 31.47–50.65)
RVTLC REF: 41
RVTLCULN: 51
RVULN: 2.37
SGAW PRE REF: 52.8 %
SGAW PRE: 0.05 1/(CMH2O*S) (ref 0.1–0.1)
SGAW REF: 0.1
TLC LLN: 3.28
TLC PRE REF: 96.3 %
TLC REF: 4.27
TLC ULN: 5.26
ULN IC: ABNORMAL
VA PRE: 3.38 L (ref 4.12–4.12)
VA SINGLE BREATH LLN: 4.12
VA SINGLE BREATH PRE REF: 82 %
VA SINGLE BREATH REF: 4.12
VASINGLEBREATHULN: 4.12
VC LLN: 1.93
VC PRE REF: 76 %
VC PRE: 1.97 L (ref 1.93–3.29)
VC REF: 2.6
VC ULN: 3.29

## 2024-05-24 PROCEDURE — 3288F FALL RISK ASSESSMENT DOCD: CPT | Mod: CPTII,GC,S$GLB, | Performed by: INTERNAL MEDICINE

## 2024-05-24 PROCEDURE — 1101F PT FALLS ASSESS-DOCD LE1/YR: CPT | Mod: CPTII,GC,S$GLB, | Performed by: INTERNAL MEDICINE

## 2024-05-24 PROCEDURE — 3008F BODY MASS INDEX DOCD: CPT | Mod: CPTII,GC,S$GLB, | Performed by: INTERNAL MEDICINE

## 2024-05-24 PROCEDURE — 1159F MED LIST DOCD IN RCRD: CPT | Mod: CPTII,GC,S$GLB, | Performed by: INTERNAL MEDICINE

## 2024-05-24 PROCEDURE — 94010 BREATHING CAPACITY TEST: CPT | Mod: 59,S$GLB,, | Performed by: INTERNAL MEDICINE

## 2024-05-24 PROCEDURE — 94726 PLETHYSMOGRAPHY LUNG VOLUMES: CPT | Mod: S$GLB,,, | Performed by: INTERNAL MEDICINE

## 2024-05-24 PROCEDURE — 94729 DIFFUSING CAPACITY: CPT | Mod: S$GLB,,, | Performed by: INTERNAL MEDICINE

## 2024-05-24 PROCEDURE — 99999 PR PBB SHADOW E&M-EST. PATIENT-LVL III: CPT | Mod: PBBFAC,GC,, | Performed by: SURGERY

## 2024-05-24 PROCEDURE — 94618 PULMONARY STRESS TESTING: CPT | Mod: S$GLB,,, | Performed by: INTERNAL MEDICINE

## 2024-05-24 PROCEDURE — 3079F DIAST BP 80-89 MM HG: CPT | Mod: CPTII,GC,S$GLB, | Performed by: INTERNAL MEDICINE

## 2024-05-24 PROCEDURE — 99214 OFFICE O/P EST MOD 30 MIN: CPT | Mod: 25,GC,S$GLB, | Performed by: INTERNAL MEDICINE

## 2024-05-24 PROCEDURE — 4010F ACE/ARB THERAPY RXD/TAKEN: CPT | Mod: CPTII,GC,S$GLB, | Performed by: INTERNAL MEDICINE

## 2024-05-24 PROCEDURE — 3077F SYST BP >= 140 MM HG: CPT | Mod: CPTII,GC,S$GLB, | Performed by: INTERNAL MEDICINE

## 2024-05-24 NOTE — PROCEDURES
Alanna Angelo is a 64 y.o.  female patient, who presents for a 6 minute walk test ordered by Ellerman, MD.  The diagnosis is Shortness of Breath; Emphysema.  The patient's BMI is 28.2 kg/m2.  Predicted distance (lower limit of normal) is 323.08 meters.      Test Results:    The test was completed without stopping.  The total time walked was 360 seconds.  During walking, the patient reported:  Leg pain.  The patient used no assistive devices during testing.     05/24/2024---------Distance: 389.23 meters (1277 feet)     O2 Sat % Supplemental Oxygen Heart Rate Blood Pressure Livier Scale   Pre-exercise  (Resting) 97 % Room Air 63 bpm 164/78 mmHg 0   During Exercise 100 % Room Air 81 bpm 178/82 mmHg 0   Post-exercise  (Recovery) 98 % Room Air  67 bpm       Recovery Time: 86 seconds    Performing nurse/tech: BEBETO Rolle      PREVIOUS STUDY:   The patient has not had a previous study.      CLINICAL INTERPRETATION:  Six minute walk distance is 389.23 meters (1277 feet) with no dyspnea.  During exercise, there was no desaturation while breathing room air.  Both blood pressure and heart rate remained stable with walking.  Hypertension was present prior to exercise.  The patient reported non-pulmonary symptoms during exercise.  No previous study performed.  Based upon age and body mass index, exercise capacity is normal.

## 2024-05-27 NOTE — ASSESSMENT & PLAN NOTE
6mm lung nodule in R apical region, unchanged. Continue routine low-dose CT scan screening.  - stable, no further work-up needed  - resume low dose CT scan screening

## 2024-05-27 NOTE — ASSESSMENT & PLAN NOTE
"Reviewed the imaging and PFTs together, noting a decrease in her DLCO over time with smoking. She still thinks its "not that bad" for smoking her whole life. Contiued to encourage smoking cessation.  - smoking cessation counseling  - denied referral to smoking cessation program  "

## 2024-05-27 NOTE — PROGRESS NOTES
Subjective:      Patient ID: Alanna Angelo is a 64 y.o. female.    Chief Complaint: Pulmonary Nodules    63yo F w/ previous history of smoking who presents to pulmonary clinic at the request of her PCP. Currently, she denies any breathing issues. She has not dyspnea on exertion, coughing, sputum production, wheezing, or any other concerns. She can complete all her daily activities and work in the yard without difficulty. She occasionally has to stop when doing heavy activity but this is normal for her.     She has smoked for the majority of her life and is currently in the process of quitting. Her last cigarette was about 1 month ago. She is not on inhaler therapy.     Previous imaging revealed a R apical nodule that was being monitored with serial CT scans both diagnostic and low dose screening.     Pulmonary Nodules      Review of Systems   Respiratory: Negative.     Cardiovascular: Negative.    All other systems reviewed and are negative.    Objective:     Physical Exam   Constitutional: She is oriented to person, place, and time. She appears well-developed and well-nourished.   Cardiovascular: Normal rate and regular rhythm.   Pulmonary/Chest: Normal expansion, symmetric chest wall expansion and effort normal. She has no wheezes. She has no rhonchi. She has no rales.   Neurological: She is alert and oriented to person, place, and time.   Skin: Skin is warm and dry.   Nursing note and vitals reviewed.    Personal Diagnostic Review    PFT 5/24/24: Normal spirometry and lung volumes. DLCO 51% (down from 85% 5 years ago)    CT Chest 2024: 6mm R apical nodule; stable from previous CT scans. Scattered upper lobe bilateral paraseptal emphysematous changes       Assessment:     1. Centrilobular emphysema    2. Multiple lung nodules    3. Tobacco use             No orders of the defined types were placed in this encounter.      Plan:     Problem List Items Addressed This Visit          Pulmonary    Multiple lung  "nodules    Overview     Noted on CT 6/21/2019         Current Assessment & Plan     6mm lung nodule in R apical region, unchanged. Continue routine low-dose CT scan screening.  - stable, no further work-up needed  - resume low dose CT scan screening         Centrilobular emphysema    Overview     Noted on previous CT. Still intermittently smoking.         Current Assessment & Plan     Stable. Unchanged. Counseled on smoking cessation.            Other    Tobacco use    Current Assessment & Plan     Reviewed the imaging and PFTs together, noting a decrease in her DLCO over time with smoking. She still thinks its "not that bad" for smoking her whole life. Contiued to encourage smoking cessation.  - smoking cessation counseling  - denied referral to smoking cessation program          Mrs. Angelo does not need routine pulmonary follow-up but could consider repeat PFTs in a few years, especially if she continues to smoke.    "

## 2024-05-28 DIAGNOSIS — Z00.00 ENCOUNTER FOR MEDICARE ANNUAL WELLNESS EXAM: ICD-10-CM

## 2024-10-21 ENCOUNTER — TELEPHONE (OUTPATIENT)
Dept: PRIMARY CARE CLINIC | Facility: CLINIC | Age: 64
End: 2024-10-21
Payer: MEDICARE

## 2024-10-21 NOTE — TELEPHONE ENCOUNTER
----- Message from Marla sent at 10/21/2024  1:54 PM CDT -----  Contact: [phone]  Hilda states please confirm if patient has CVD, CHF and/or diabetics. Hilda @ [phone] Ref# [id number]. Please call and advise.    Thank you and have a great day.

## 2025-03-24 DIAGNOSIS — Z00.00 ENCOUNTER FOR MEDICARE ANNUAL WELLNESS EXAM: ICD-10-CM

## 2025-04-15 DIAGNOSIS — Z72.0 TOBACCO USE: ICD-10-CM

## 2025-04-15 DIAGNOSIS — I10 ESSENTIAL HYPERTENSION: ICD-10-CM

## 2025-04-15 RX ORDER — ATORVASTATIN CALCIUM 40 MG/1
40 TABLET, FILM COATED ORAL
Qty: 90 TABLET | Refills: 3 | OUTPATIENT
Start: 2025-04-15

## 2025-04-15 NOTE — TELEPHONE ENCOUNTER
Refill Decision Note  Deonna CARPENTER. Inappropriate Request     Alanna Angelo  is requesting a refill authorization.  Brief Assessment and Rationale for Refill:  Quick Discontinue     Medication Therapy Plan:  prescription was discontinued on 4/18/2024 by Carmen Devries MD.    Medication Reconciliation Completed: No   Comments:      Provider Staff:  Action required for this patient    Requires appointment   Requires labs      Please see care gap opportunities below in Care Due Message.    Thanks!  [facility]     Appointments      Date Provider   Last Visit   3/26/2024 Carmen Devries MD   Next Visit   Visit date not found Carmen Devries MD          Note composed:2:01 PM 04/15/2025

## 2025-04-15 NOTE — TELEPHONE ENCOUNTER
Care Due:                  Date            Visit Type   Department     Provider  --------------------------------------------------------------------------------                                EP -                              PRIMARY      [facility]  Last Visit: 03-      CARE ([facility])   CARE           Carmen Devries  Next Visit: None Scheduled  None         None Found                                                            Last  Test          Frequency    Reason                     Performed    Due Date  --------------------------------------------------------------------------------    Office Visit  15 months..  FLUoxetine, losartan,      03- 06-                             omeprazole...............    CMP.........  12 months..  losartan.................  03- 03-    [facility] Embedded Care Due Messages. Reference number: [id number].   4/15/2025 1:35:18 PM CDT

## 2025-04-16 DIAGNOSIS — I10 ESSENTIAL HYPERTENSION: ICD-10-CM

## 2025-04-16 DIAGNOSIS — Z72.0 TOBACCO USE: ICD-10-CM

## 2025-04-16 RX ORDER — ATORVASTATIN CALCIUM 80 MG/1
80 TABLET, FILM COATED ORAL DAILY
Qty: 90 TABLET | Refills: 3 | Status: SHIPPED | OUTPATIENT
Start: 2025-04-16

## 2025-04-17 ENCOUNTER — TELEPHONE (OUTPATIENT)
Dept: PRIMARY CARE CLINIC | Facility: CLINIC | Age: 65
End: 2025-04-17
Payer: MEDICARE

## 2025-04-17 NOTE — TELEPHONE ENCOUNTER
----- Message from Genoveva sent at 4/17/2025  9:26 AM CDT -----  Contact: Mr. Don rob Pharmacy Tech Phone# [phone]  Requesting an RX refill or new RX.Is this a refill or new RX: RefillRX name and strength omeprazole (PRILOSEC) 40 MG capsuleIs this a 30 day or 90 day RX: 90Pharmacy name and phone # [id number]  [place] - [address]  AT [facility] & [id number] [place] [address]Ovjct: [phone] Fax: [phone] doctors have asked that we provide their patients with the following 2 reminders -- prescription refills can take up to 72 hours, and a friendly reminder that in the future you can use your [facility] account to request refills:

## 2025-04-17 NOTE — TELEPHONE ENCOUNTER
Patient aware she needs appointment last seen since 3/2024. She is going to be calling back to get scheduled

## 2025-05-12 DIAGNOSIS — I70.0 ATHEROSCLEROSIS OF AORTA: ICD-10-CM

## 2025-05-12 DIAGNOSIS — F17.200 CURRENT SMOKER: ICD-10-CM

## 2025-05-12 DIAGNOSIS — K22.70 BARRETT'S ESOPHAGUS WITHOUT DYSPLASIA: ICD-10-CM

## 2025-05-12 DIAGNOSIS — Z51.81 ENCOUNTER FOR MONITORING LONG-TERM PROTON PUMP INHIBITOR THERAPY: Primary | ICD-10-CM

## 2025-05-12 DIAGNOSIS — Z12.31 SCREENING MAMMOGRAM FOR BREAST CANCER: ICD-10-CM

## 2025-05-12 DIAGNOSIS — E53.8 LOW SERUM VITAMIN B12: ICD-10-CM

## 2025-05-12 DIAGNOSIS — Z13.220 ENCOUNTER FOR LIPID SCREENING FOR CARDIOVASCULAR DISEASE: ICD-10-CM

## 2025-05-12 DIAGNOSIS — I25.10 CORONARY ARTERY DISEASE INVOLVING NATIVE CORONARY ARTERY OF NATIVE HEART WITHOUT ANGINA PECTORIS: ICD-10-CM

## 2025-05-12 DIAGNOSIS — Z79.899 OTHER LONG TERM (CURRENT) DRUG THERAPY: ICD-10-CM

## 2025-05-12 DIAGNOSIS — Z79.899 ENCOUNTER FOR MONITORING LONG-TERM PROTON PUMP INHIBITOR THERAPY: Primary | ICD-10-CM

## 2025-05-12 DIAGNOSIS — Z12.2 ENCOUNTER FOR SCREENING FOR LUNG CANCER: ICD-10-CM

## 2025-05-12 DIAGNOSIS — E55.9 VITAMIN D DEFICIENCY: ICD-10-CM

## 2025-05-12 DIAGNOSIS — Z13.6 ENCOUNTER FOR LIPID SCREENING FOR CARDIOVASCULAR DISEASE: ICD-10-CM

## 2025-05-12 DIAGNOSIS — I10 ESSENTIAL HYPERTENSION: ICD-10-CM

## 2025-05-12 DIAGNOSIS — Z87.891 PERSONAL HISTORY OF NICOTINE DEPENDENCE: ICD-10-CM

## 2025-05-12 DIAGNOSIS — Z00.00 ANNUAL PHYSICAL EXAM: ICD-10-CM

## 2025-05-12 RX ORDER — METOPROLOL SUCCINATE 50 MG/1
50 TABLET, EXTENDED RELEASE ORAL DAILY
Qty: 90 TABLET | Refills: 0 | Status: SHIPPED | OUTPATIENT
Start: 2025-05-12

## 2025-05-12 NOTE — TELEPHONE ENCOUNTER
Please schedule her labs as soon as possible as well as follow-up with me    Please also schedule for mammogram in low-dose lung cancer screening

## 2025-05-12 NOTE — TELEPHONE ENCOUNTER
Pt requesting refill of metoprolol. Upcoming annual appt scheduled with Dr. Devries on 07/31/25. Med and pharmacy pended. Please advise.

## 2025-05-12 NOTE — TELEPHONE ENCOUNTER
Spoke with pt. Ordered annual labs based off last annual labs ordered by Dr. Devries in 2023. Pt scheduled to have labs done on 07/22/25 at 9:10 am. Pt verbalized understanding.

## 2025-05-12 NOTE — TELEPHONE ENCOUNTER
----- Message from Ame sent at 5/12/2025  1:53 PM CDT -----  Contact: Self/[phone]  Type: Orders RequestWhat orders/ testing are being requested?Annual LabsIs there a future appointment scheduled for the patient with PCP? Yes When?7/31Would you prefer a response via Click Contact? Call back Comments:

## 2025-05-12 NOTE — TELEPHONE ENCOUNTER
----- Message from Ame sent at 5/12/2025  1:55 PM CDT -----  Contact: Self/[phone]  Requesting an RX refill or new RX.Is this a refill or new RX: NewRX name and strength   metoprolol succinate (TOPROL-XL) 50 MG 24 hr tabletIs this a 30 day or 90 day RX: Pharmacy name and phone # :  [facility] #[id number]  [place] - [address]  AT [facility] & [id number] [place] [address]Hzuou: [phone] Fax: [phone] The doctors have asked that we provide their patients with the following 2 reminders -- prescription refills can take up to 72 hours, and a friendly reminder that in the future you can use your [facility] account to request refills: Pt stated that she has been out of her medication for 3 days

## 2025-05-12 NOTE — TELEPHONE ENCOUNTER
Care Due:                  Date            Visit Type   Department     Provider  --------------------------------------------------------------------------------                                EP -                              PRIMARY      [facility] PRIMARY  Last Visit: 03-      CARE ([facility])   CARE           Carmen Devries                              EP -                              PRIMARY      [facility] PRIMARY  Next Visit: 07-      CARE ([facility])   CARE           Carmen Devries                                                            Last  Test          Frequency    Reason                     Performed    Due Date  --------------------------------------------------------------------------------    Lipid Panel.  12 months..  atorvastatin.............  09- 08-    [facility] Embedded Care Due Messages. Reference number: [id number].   5/12/2025 2:48:46 PM CDT

## 2025-05-14 ENCOUNTER — TELEPHONE (OUTPATIENT)
Dept: PRIMARY CARE CLINIC | Facility: CLINIC | Age: 65
End: 2025-05-14
Payer: MEDICARE

## 2025-05-14 NOTE — TELEPHONE ENCOUNTER
----- Message from Genoveva sent at 5/14/2025 10:51 AM CDT -----  Contact: [phone]  Type: Rx Clarification/ Additional Information/ QuestionsMedication: metoprolol succinate (TOPROL-XL) 50 MG 24 hr tabletWhat questions do you have about the medication, if any? StatusPharmacy number: [id number] - [place] [address]  [address]  [facility] [id number] [place] [address]Cnfxl: [phone] Fax: [phone]

## 2025-07-01 ENCOUNTER — HOSPITAL ENCOUNTER (OUTPATIENT)
Dept: RADIOLOGY | Facility: HOSPITAL | Age: 65
Discharge: HOME | End: 2025-07-01
Attending: FAMILY MEDICINE
Payer: MEDICARE

## 2025-07-01 ENCOUNTER — RESULTS FOLLOW-UP (OUTPATIENT)
Dept: PRIMARY CARE CLINIC | Facility: CLINIC | Age: 65
End: 2025-07-01

## 2025-07-01 DIAGNOSIS — Z87.891 PERSONAL HISTORY OF NICOTINE DEPENDENCE: ICD-10-CM

## 2025-07-01 DIAGNOSIS — F17.200 CURRENT SMOKER: ICD-10-CM

## 2025-07-01 DIAGNOSIS — Z12.2 ENCOUNTER FOR SCREENING FOR LUNG CANCER: ICD-10-CM

## 2025-07-01 PROCEDURE — 71271 CT THORAX LUNG CANCER SCR C-: CPT | Mod: 26,,, | Performed by: RADIOLOGY

## 2025-07-01 PROCEDURE — 71271 CT THORAX LUNG CANCER SCR C-: CPT | Mod: TC

## 2025-07-02 ENCOUNTER — TELEPHONE (OUTPATIENT)
Dept: PRIMARY CARE CLINIC | Facility: CLINIC | Age: 65
End: 2025-07-02
Payer: MEDICARE

## 2025-07-02 ENCOUNTER — TELEPHONE (OUTPATIENT)
Dept: PULMONOLOGY | Facility: CLINIC | Age: 65
End: 2025-07-02
Payer: MEDICARE

## 2025-07-02 DIAGNOSIS — J44.9 CHRONIC OBSTRUCTIVE PULMONARY DISEASE, UNSPECIFIED COPD TYPE: Primary | ICD-10-CM

## 2025-07-02 NOTE — TELEPHONE ENCOUNTER
Patient scheduled with Dr Gregg on 8/1/25 at 1pm with pft's prior per request fropm Dr Devries. Appointment mailed.

## 2025-07-02 NOTE — TELEPHONE ENCOUNTER
----- Message from Carmen Devries MD sent at 7/1/2025  3:53 PM CDT -----  Overall the CT scan of your chest is stable, there is 1 area on the right that might show some more prominence, are you having any fever, shortness of breath, chest pain or have you been feeling okay   lately?  Looks like you are overdue for a visit with me and Pulmonary, I will have my staff reach out to help you schedule as soon as possible  ----- Message -----  From: Karlos Rad Results In  Sent: 7/1/2025   8:31 AM CDT  To: Carmen Devries MD

## 2025-07-02 NOTE — TELEPHONE ENCOUNTER
Spoke with pt regarding CT scan results. Pt verbalized understanding. Pt stated that she has been feeling fine with no complaints or concerns. Pt stated that she already has an appt scheduled with Dr. Devries on 07/31/25.     Informed pt that our pt navigator will give her a call to schedule the pulm f/u. Pt verbalized understanding.

## 2025-07-02 NOTE — TELEPHONE ENCOUNTER
----- Message from Med Assistant Rubia sent at 7/2/2025 10:44 AM CDT -----  Regarding: RE: FU  Okay Thanks, if can schedule with some one else.    Thanks  ----- Message -----  From: Lety Gerber MA  Sent: 7/2/2025  10:40 AM CDT  To: Rubia Herrera MA  Subject: RE: LEONIDAS                                           Good morning,  Dr Ellerman does not work at [facility] any longer. Please advise.     Thank you,   Lety  ----- Message -----  From: Rubia Herrera MA  Sent: 7/2/2025   9:58 AM CDT  [facility]  Subject: LEONIDAS Devries would like Patient to follow up with Dr Ellerman, if can call patient to schedule.    Thanks

## 2025-07-02 NOTE — TELEPHONE ENCOUNTER
Can you please assist this pt with scheduling the a pulmonology f/u appt with Dr. Ellerman?     Thank you!

## 2025-07-22 ENCOUNTER — LAB VISIT (OUTPATIENT)
Dept: LAB | Facility: HOSPITAL | Age: 65
End: 2025-07-22
Attending: FAMILY MEDICINE
Payer: MEDICARE

## 2025-07-22 DIAGNOSIS — I25.10 CORONARY ARTERY DISEASE INVOLVING NATIVE CORONARY ARTERY OF NATIVE HEART WITHOUT ANGINA PECTORIS: ICD-10-CM

## 2025-07-22 DIAGNOSIS — Z13.220 ENCOUNTER FOR LIPID SCREENING FOR CARDIOVASCULAR DISEASE: ICD-10-CM

## 2025-07-22 DIAGNOSIS — K22.70 BARRETT'S ESOPHAGUS WITHOUT DYSPLASIA: ICD-10-CM

## 2025-07-22 DIAGNOSIS — Z79.899 ENCOUNTER FOR MONITORING LONG-TERM PROTON PUMP INHIBITOR THERAPY: ICD-10-CM

## 2025-07-22 DIAGNOSIS — Z79.899 OTHER LONG TERM (CURRENT) DRUG THERAPY: ICD-10-CM

## 2025-07-22 DIAGNOSIS — Z13.6 ENCOUNTER FOR LIPID SCREENING FOR CARDIOVASCULAR DISEASE: ICD-10-CM

## 2025-07-22 DIAGNOSIS — Z00.00 ANNUAL PHYSICAL EXAM: ICD-10-CM

## 2025-07-22 DIAGNOSIS — I10 ESSENTIAL HYPERTENSION: ICD-10-CM

## 2025-07-22 DIAGNOSIS — E53.8 LOW SERUM VITAMIN B12: ICD-10-CM

## 2025-07-22 DIAGNOSIS — Z51.81 ENCOUNTER FOR MONITORING LONG-TERM PROTON PUMP INHIBITOR THERAPY: ICD-10-CM

## 2025-07-22 DIAGNOSIS — E55.9 VITAMIN D DEFICIENCY: ICD-10-CM

## 2025-07-22 LAB
25(OH)D3+25(OH)D2 SERPL-MCNC: 39 NG/ML (ref 30–96)
ABSOLUTE EOSINOPHIL (OHS): 0.21 K/UL
ABSOLUTE MONOCYTE (OHS): 0.38 K/UL (ref 0.3–1)
ABSOLUTE NEUTROPHIL COUNT (OHS): 3.21 K/UL (ref 1.8–7.7)
ALBUMIN SERPL BCP-MCNC: 3.7 G/DL (ref 3.5–5.2)
ALP SERPL-CCNC: 82 UNIT/L (ref 40–150)
ALT SERPL W/O P-5'-P-CCNC: 12 UNIT/L (ref 10–44)
ANION GAP (OHS): 12 MMOL/L (ref 8–16)
AST SERPL-CCNC: 21 UNIT/L (ref 11–45)
BASOPHILS # BLD AUTO: 0.03 K/UL
BASOPHILS NFR BLD AUTO: 0.6 %
BILIRUB SERPL-MCNC: 0.8 MG/DL (ref 0.1–1)
BUN SERPL-MCNC: 12 MG/DL (ref 8–23)
CALCIUM SERPL-MCNC: 8.4 MG/DL (ref 8.7–10.5)
CHLORIDE SERPL-SCNC: 105 MMOL/L (ref 95–110)
CHOLEST SERPL-MCNC: 124 MG/DL (ref 120–199)
CHOLEST/HDLC SERPL: 2.7 {RATIO} (ref 2–5)
CO2 SERPL-SCNC: 25 MMOL/L (ref 23–29)
CREAT SERPL-MCNC: 0.7 MG/DL (ref 0.5–1.4)
EAG (OHS): 88 MG/DL (ref 68–131)
ERYTHROCYTE [DISTWIDTH] IN BLOOD BY AUTOMATED COUNT: 15.2 % (ref 11.5–14.5)
GFR SERPLBLD CREATININE-BSD FMLA CKD-EPI: >60 ML/MIN/1.73/M2
GLUCOSE SERPL-MCNC: 81 MG/DL (ref 70–110)
HBA1C MFR BLD: 4.7 % (ref 4–5.6)
HCT VFR BLD AUTO: 34.5 % (ref 37–48.5)
HDLC SERPL-MCNC: 46 MG/DL (ref 40–75)
HDLC SERPL: 37.1 % (ref 20–50)
HGB BLD-MCNC: 11 GM/DL (ref 12–16)
IMM GRANULOCYTES # BLD AUTO: 0.01 K/UL (ref 0–0.04)
IMM GRANULOCYTES NFR BLD AUTO: 0.2 % (ref 0–0.5)
LDLC SERPL CALC-MCNC: 63.8 MG/DL (ref 63–159)
LYMPHOCYTES # BLD AUTO: 0.97 K/UL (ref 1–4.8)
MAGNESIUM SERPL-MCNC: 1.8 MG/DL (ref 1.6–2.6)
MCH RBC QN AUTO: 30.1 PG (ref 27–31)
MCHC RBC AUTO-ENTMCNC: 31.9 G/DL (ref 32–36)
MCV RBC AUTO: 95 FL (ref 82–98)
NONHDLC SERPL-MCNC: 78 MG/DL
NUCLEATED RBC (/100WBC) (OHS): 0 /100 WBC
PLATELET # BLD AUTO: 144 K/UL (ref 150–450)
PMV BLD AUTO: 13 FL (ref 9.2–12.9)
POTASSIUM SERPL-SCNC: 3.3 MMOL/L (ref 3.5–5.1)
PROT SERPL-MCNC: 6.9 GM/DL (ref 6–8.4)
RBC # BLD AUTO: 3.65 M/UL (ref 4–5.4)
RELATIVE EOSINOPHIL (OHS): 4.4 %
RELATIVE LYMPHOCYTE (OHS): 20.2 % (ref 18–48)
RELATIVE MONOCYTE (OHS): 7.9 % (ref 4–15)
RELATIVE NEUTROPHIL (OHS): 66.7 % (ref 38–73)
SODIUM SERPL-SCNC: 142 MMOL/L (ref 136–145)
TRIGL SERPL-MCNC: 71 MG/DL (ref 30–150)
TSH SERPL-ACNC: 2.55 UIU/ML (ref 0.4–4)
VIT B12 SERPL-MCNC: 403 PG/ML (ref 210–950)
WBC # BLD AUTO: 4.81 K/UL (ref 3.9–12.7)

## 2025-07-22 PROCEDURE — 82607 VITAMIN B-12: CPT | Mod: HCNC

## 2025-07-22 PROCEDURE — 36415 COLL VENOUS BLD VENIPUNCTURE: CPT | Mod: HCNC

## 2025-07-22 PROCEDURE — 85025 COMPLETE CBC W/AUTO DIFF WBC: CPT | Mod: HCNC

## 2025-07-22 PROCEDURE — 84443 ASSAY THYROID STIM HORMONE: CPT | Mod: HCNC

## 2025-07-22 PROCEDURE — 80053 COMPREHEN METABOLIC PANEL: CPT | Mod: HCNC

## 2025-07-22 PROCEDURE — 82306 VITAMIN D 25 HYDROXY: CPT | Mod: HCNC

## 2025-07-22 PROCEDURE — 83036 HEMOGLOBIN GLYCOSYLATED A1C: CPT | Mod: HCNC

## 2025-07-22 PROCEDURE — 80061 LIPID PANEL: CPT | Mod: HCNC

## 2025-07-22 PROCEDURE — 83735 ASSAY OF MAGNESIUM: CPT | Mod: HCNC

## 2025-07-31 ENCOUNTER — OFFICE VISIT (OUTPATIENT)
Dept: PRIMARY CARE CLINIC | Facility: CLINIC | Age: 65
End: 2025-07-31
Payer: MEDICARE

## 2025-07-31 ENCOUNTER — LAB VISIT (OUTPATIENT)
Dept: LAB | Facility: HOSPITAL | Age: 65
End: 2025-07-31
Attending: FAMILY MEDICINE
Payer: MEDICARE

## 2025-07-31 VITALS
HEIGHT: 60 IN | OXYGEN SATURATION: 98 % | BODY MASS INDEX: 28.17 KG/M2 | DIASTOLIC BLOOD PRESSURE: 74 MMHG | SYSTOLIC BLOOD PRESSURE: 136 MMHG | HEART RATE: 68 BPM | WEIGHT: 143.5 LBS

## 2025-07-31 DIAGNOSIS — Z79.899 ENCOUNTER FOR MONITORING LONG-TERM PROTON PUMP INHIBITOR THERAPY: ICD-10-CM

## 2025-07-31 DIAGNOSIS — D64.9 ANEMIA, UNSPECIFIED TYPE: ICD-10-CM

## 2025-07-31 DIAGNOSIS — I10 ESSENTIAL HYPERTENSION: Primary | ICD-10-CM

## 2025-07-31 DIAGNOSIS — Z72.820 POOR SLEEP: ICD-10-CM

## 2025-07-31 DIAGNOSIS — I70.0 ATHEROSCLEROSIS OF AORTA: ICD-10-CM

## 2025-07-31 DIAGNOSIS — I25.10 CORONARY ARTERY DISEASE INVOLVING NATIVE HEART WITHOUT ANGINA PECTORIS, UNSPECIFIED VESSEL OR LESION TYPE: ICD-10-CM

## 2025-07-31 DIAGNOSIS — Z72.0 TOBACCO USE: ICD-10-CM

## 2025-07-31 DIAGNOSIS — K22.70 BARRETT'S ESOPHAGUS WITHOUT DYSPLASIA: ICD-10-CM

## 2025-07-31 DIAGNOSIS — Z51.81 ENCOUNTER FOR MONITORING LONG-TERM PROTON PUMP INHIBITOR THERAPY: ICD-10-CM

## 2025-07-31 LAB
FERRITIN SERPL-MCNC: 92 NG/ML (ref 20–300)
IRON SATN MFR SERPL: 15 % (ref 20–50)
IRON SERPL-MCNC: 52 UG/DL (ref 30–160)
TIBC SERPL-MCNC: 354 UG/DL (ref 250–450)
TRANSFERRIN SERPL-MCNC: 239 MG/DL (ref 200–375)

## 2025-07-31 PROCEDURE — 1126F AMNT PAIN NOTED NONE PRSNT: CPT | Mod: CPTII,HCNC,S$GLB, | Performed by: FAMILY MEDICINE

## 2025-07-31 PROCEDURE — 3078F DIAST BP <80 MM HG: CPT | Mod: CPTII,HCNC,S$GLB, | Performed by: FAMILY MEDICINE

## 2025-07-31 PROCEDURE — 36415 COLL VENOUS BLD VENIPUNCTURE: CPT | Mod: HCNC,PN

## 2025-07-31 PROCEDURE — 4010F ACE/ARB THERAPY RXD/TAKEN: CPT | Mod: CPTII,HCNC,S$GLB, | Performed by: FAMILY MEDICINE

## 2025-07-31 PROCEDURE — 82728 ASSAY OF FERRITIN: CPT | Mod: HCNC

## 2025-07-31 PROCEDURE — 84466 ASSAY OF TRANSFERRIN: CPT | Mod: HCNC

## 2025-07-31 PROCEDURE — 1101F PT FALLS ASSESS-DOCD LE1/YR: CPT | Mod: CPTII,HCNC,S$GLB, | Performed by: FAMILY MEDICINE

## 2025-07-31 PROCEDURE — 3288F FALL RISK ASSESSMENT DOCD: CPT | Mod: CPTII,HCNC,S$GLB, | Performed by: FAMILY MEDICINE

## 2025-07-31 PROCEDURE — 99999 PR PBB SHADOW E&M-EST. PATIENT-LVL III: CPT | Mod: PBBFAC,HCNC,, | Performed by: FAMILY MEDICINE

## 2025-07-31 PROCEDURE — 3075F SYST BP GE 130 - 139MM HG: CPT | Mod: CPTII,HCNC,S$GLB, | Performed by: FAMILY MEDICINE

## 2025-07-31 PROCEDURE — 3044F HG A1C LEVEL LT 7.0%: CPT | Mod: CPTII,HCNC,S$GLB, | Performed by: FAMILY MEDICINE

## 2025-07-31 PROCEDURE — 99214 OFFICE O/P EST MOD 30 MIN: CPT | Mod: HCNC,S$GLB,, | Performed by: FAMILY MEDICINE

## 2025-07-31 PROCEDURE — 3008F BODY MASS INDEX DOCD: CPT | Mod: CPTII,HCNC,S$GLB, | Performed by: FAMILY MEDICINE

## 2025-07-31 PROCEDURE — 1160F RVW MEDS BY RX/DR IN RCRD: CPT | Mod: CPTII,HCNC,S$GLB, | Performed by: FAMILY MEDICINE

## 2025-07-31 PROCEDURE — 1159F MED LIST DOCD IN RCRD: CPT | Mod: CPTII,HCNC,S$GLB, | Performed by: FAMILY MEDICINE

## 2025-07-31 RX ORDER — TRAZODONE HYDROCHLORIDE 50 MG/1
50 TABLET ORAL NIGHTLY
Qty: 30 TABLET | Refills: 1 | Status: SHIPPED | OUTPATIENT
Start: 2025-07-31 | End: 2026-07-31

## 2025-07-31 RX ORDER — METOPROLOL SUCCINATE 50 MG/1
50 TABLET, EXTENDED RELEASE ORAL DAILY
Qty: 90 TABLET | Refills: 3 | Status: SHIPPED | OUTPATIENT
Start: 2025-07-31

## 2025-07-31 RX ORDER — NITROGLYCERIN 0.4 MG/1
0.4 TABLET SUBLINGUAL EVERY 5 MIN PRN
Qty: 25 TABLET | Refills: 11 | Status: SHIPPED | OUTPATIENT
Start: 2025-07-31

## 2025-07-31 NOTE — PATIENT INSTRUCTIONS
Start take 1/2 table Trazodone and after 2-3 nights can increase to 50 mg a night then let me know after a few weeks how you do and we can increase if needed    Try cutting off TV 1-2 hours before bed

## 2025-07-31 NOTE — PROGRESS NOTES
"Subjective:       Patient ID: Alanna Angelo is a 65 y.o. female.    Chief Complaint: Hypertension    HPI   66 y/o female smoker, with CAD s/p PTCA , Low HDL, HTN, Nguyen's esophagus, Low B12, Multiple Lung nodules, Emphysema is here for HTN follow up.     She is feeling good overall, she denies f/n/v/d/constipation/cp/sob, she has throat clearing cough, she quit smoking regularly, she smokes a few cigarettes a month, she denies urinary sx. Appetite ok. Sleeping poor, she can fall asleep but cannot stay asleep. She does watch TV in bed at night. She is active at work. Mood good, she stopped the fluoxetine as she was feeling better.    PFTs and pulmonary follow up set    Not checking home bp    Has never taken Nitroglycerin but would like to have a non  bottle     HTN: Toprol XL 50 mg daily, Losartan 100 mg daily  GERD/Barretts: EGD 2023 repeat 3 years, omeprazole 40 mg daily  Low B12: off injections for over a year labs 2025 stable  Emphysema/Lung nodules: following with pulmonary, PFTs due, CT chest 2025 stable but "increased prominence of consolidation RML"  GYN; hx of L breast biopsy benign , mmg 2023 repeat set, dexa 2023 normal, pelvic due  CAD/aortic atherosclerosis/Low HDL: following with cardiology, on bb, baby asa, statin; off Plavix   Poor sleep: melatonin not helpful  Tobacco use: a few cigarettes a month  Colonoscopy: 2018 repeat 10 years  Eye exam due  Dental utd    Labs 2025 reviewed, mild anemia     Review of Systems    Objective:      /74 (BP Location: Right arm, Patient Position: Sitting)   Pulse 68   Ht 5' (1.524 m)   Wt 65.1 kg (143 lb 8.3 oz)   SpO2 98%   BMI 28.03 kg/m²   Physical Exam  Vitals and nursing note reviewed.   Constitutional:       Appearance: She is well-developed.   HENT:      Head: Normocephalic and atraumatic.      Mouth/Throat:      Pharynx: No oropharyngeal exudate or posterior oropharyngeal erythema.   Neck:      Thyroid: No " "thyromegaly.   Cardiovascular:      Rate and Rhythm: Normal rate and regular rhythm.      Heart sounds: Normal heart sounds.   Pulmonary:      Effort: Pulmonary effort is normal. No respiratory distress.      Breath sounds: Normal breath sounds.   Abdominal:      General: Bowel sounds are normal. There is no distension.      Palpations: Abdomen is soft. There is no mass.      Tenderness: There is no abdominal tenderness.   Musculoskeletal:      Cervical back: Normal range of motion and neck supple.      Right lower leg: No edema.      Left lower leg: No edema.   Lymphadenopathy:      Cervical: No cervical adenopathy.   Skin:     General: Skin is warm and dry.   Neurological:      Mental Status: She is alert.         Assessment:       1. Essential hypertension    2. Coronary artery disease involving native heart without angina pectoris, unspecified vessel or lesion type    3. Atherosclerosis of aorta    4. Tobacco use    5. Nguyen's esophagus without dysplasia    6. Encounter for monitoring long-term proton pump inhibitor therapy    7. Anemia, unspecified type    8. Poor sleep        Plan:   Alanna Bunch" was seen today for hypertension.    Diagnoses and all orders for this visit:    Essential hypertension  -     nitroGLYCERIN (NITROSTAT) 0.4 MG SL tablet; Place 1 tablet (0.4 mg total) under the tongue every 5 (five) minutes as needed for Chest pain.  -     metoprolol succinate (TOPROL-XL) 50 MG 24 hr tablet; Take 1 tablet (50 mg total) by mouth once daily.    Coronary artery disease involving native heart without angina pectoris, unspecified vessel or lesion type  -     nitroGLYCERIN (NITROSTAT) 0.4 MG SL tablet; Place 1 tablet (0.4 mg total) under the tongue every 5 (five) minutes as needed for Chest pain.    Atherosclerosis of aorta    Tobacco use    Ngueyn's esophagus without dysplasia    Encounter for monitoring long-term proton pump inhibitor therapy    Anemia, unspecified type  -     Iron and TIBC; " Future  -     Ferritin; Future    Poor sleep  -     traZODone (DESYREL) 50 MG tablet; Take 1 tablet (50 mg total) by mouth every evening.

## 2025-07-31 NOTE — PROGRESS NOTES
[facility] Pulmonary Note  Chief Complaint:     Pulmonary nodules    Subjective:      History of Present Illness:  Alanna Angelo is a 65 y.o. female with PMHx of pulmonary nodule, tobacco use disorder, who presents to the [facility] for follow up    Last seen in May 2024. At the time, she was noted to have centrilobular emphysema on imaging with minimal symptoms, in addition to a 6mm nodule in the right apical region. She was working on smoking cessation at the time.     CT chest recently completed with stable nodule. New vs worsened consolidation in the right middle and lingula.     Interval History:   Ms. Angelo states that she feels well overall. She does not experience any SOB at rest. Occasionally she will experience SMITH with heavy garden work, but finds her symptoms resolve quickly with 5 minutes of rest. She only has to use her rescue inhaler every 2-3 months. She is able to push mow her lawn and continues to work at  part time without difficulty. She notes a very infrequent cough that improves with just clearing her throat. She endorses GERD that is well controlled with Omeprazole. She denies any choking episodes with solids or liquids.     She denies any lower extremity swelling, sinus congestion, rhinorrhea, fever, or recent illnesses.     Previous smoker, 2022. 1ppd started in high school.     Comprehensive Pulmonary Review:  Tobacco use: Previous smoker, approximately 45 year smoking history, 1PDD. 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    Past Medical History:  Past Medical History:   Diagnosis Date    Nguyen's esophagus without dysplasia     Coronary artery disease     GERD (gastroesophageal reflux disease)     Hypertension      Past Surgical History:  Past Surgical History:   Procedure Laterality Date    BREAST BIOPSY Left     more than  10ys ago-B9    BREAST SURGERY      L breast biopsy benign      SECTION      COLONOSCOPY N/A 2018    Procedure: COLONOSCOPY;  Surgeon: Yuri Ewing MD;  Location: [facility] ENDO (4TH FLR);  Service: Endoscopy;  Laterality: N/A;  Combined order with EGD/sm/er  Patient on Plavix/okay to hold Plavix 5 days prior per Dr. Devries/see encounter notes/sm    CORONARY ANGIOPLASTY WITH STENT PLACEMENT      2 stents cx.  of RCA    ESOPHAGOGASTRODUODENOSCOPY N/A 2018    Procedure: ESOPHAGOGASTRODUODENOSCOPY (EGD);  Surgeon: Yuri Ewing MD;  Location: [facility] ENDO (4TH FLR);  Service: Endoscopy;  Laterality: N/A;    ESOPHAGOGASTRODUODENOSCOPY N/A 2023    Procedure: EGD (ESOPHAGOGASTRODUODENOSCOPY);  Surgeon: Carol Tijerina MD;  [facility]  Service: Gastroenterology;  Laterality: N/A;  Order created: previous order placed by Dr. Devries no longer usuable.  instr mailed -ml  -SWP PATIENT. AWARE OF NEW ARRIVAL TIME-RT    HEMORRHOID SURGERY      UPPER GASTROINTESTINAL ENDOSCOPY       Social History:  Lives in [place] with her .     Family History:  Family History   Problem Relation Name Age of Onset    Cataracts Mother      Rheum arthritis Mother      Stroke Father      Heart disease Father      Hypertension Father      Hyperlipidemia Father      Cancer Father          esophageal    Tuberculosis Father      Hypertension Brother      Hyperlipidemia Brother      Heart disease Brother      Diabetes Neg Hx      Asthma Neg Hx      Emphysema Neg Hx       Allergies:  Review of patient's allergies indicates:  No Known Allergies  Medications:  Current Outpatient Medications   Medication Instructions    albuterol (PROVENTIL/VENTOLIN HFA) 90 mcg/actuation inhaler INHALE 2 PUFFS INTO THE LUNGS EVERY 6 HOURS AS NEEDED FOR WHEEZING( RESCUE)    aspirin (ECOTRIN) 81 mg, Daily    atorvastatin (LIPITOR) 80 mg, Oral, Daily    cholecalciferol, vitamin D3, (VITAMIN D3) 50 mcg (2,000 unit) Cap capsule 1  capsule, Daily    losartan (COZAAR) 100 mg, Oral, Daily    metoprolol succinate (TOPROL-XL) 50 mg, Oral, Daily    nitroGLYCERIN (NITROSTAT) 0.4 mg, Sublingual, Every 5 min PRN    omega-3 fatty acids-fish oil (FISH OIL) 360-1,200 mg CpDR 2 capsules, Daily    omeprazole (PRILOSEC) 40 mg, Oral, Daily    traZODone (DESYREL) 50 mg, Oral, Nightly     Review of Systems:  Pulmonary related symptoms as per HPI.    Gen: no weight loss, no fever, no night sweats  HEENT: no sinus congestion, no throat clearing  CV: no chest pain, no orthopnea, no paroxysmal nocturnal dyspnea  GI: no melena, no hematochezia, no diarrhea, no constipation.  : no dysuria, no hematuria, no incontinence  Neuro: no focal weakness, able to ambulate  Extremities: no edema, no joint pain or swelling  Sleep: no snoring; sleep quality adequate    Objective:     Vitals:    08/01/25 1310   BP: (!) 140/78   BP Location: Right arm   Patient Position: Sitting   Pulse: (!) 55   SpO2: 97%   Weight: 64.9 kg (143 lb)   Height: 5' (1.524 m)     Body mass index is 27.93 kg/m².    Physical Examination:  General: no acute distress  Lungs: Clear to auscultation bilaterally, normal work of breathing  Heart: regular rate and rhythm, no murmur  Abdomen: non-distended, non-tender  Extremities: no edema, no clubbing  Skin: no rashes  Neurologic: alert, oriented    Laboratory:  Most Recent Data:  CBC:   Lab Results   Component Value Date    WBC 4.81 07/22/2025    HGB 11.0 (L) 07/22/2025     (L) 07/22/2025    MCV 95 07/22/2025    RDW 15.2 (H) 07/22/2025     BMP:   Lab Results   Component Value Date     07/22/2025    K 3.3 (L) 07/22/2025     07/22/2025    CO2 25 07/22/2025    BUN 12 07/22/2025    CREATININE 0.7 07/22/2025    GLU 81 07/22/2025    CALCIUM 8.4 (L) 07/22/2025    MG 1.8 07/22/2025     LFTs:   Lab Results   Component Value Date    PROT 6.9 07/22/2025    ALBUMIN 3.7 07/22/2025    AST 21 07/22/2025    ALKPHOS 82 07/22/2025    ALT 12 07/22/2025  "    BNP: No results found for: "BNP"  Other Labs:  None    Pulmonary Function Test/Six Minute Walk Test:  PFT 2025  FVC: 1.92 (74%)  FEV1: 1.46 (72%)  FEV1/FVC: 76  T.20 (98%)  DLCO: 10.5 (54%)    PFT: 2024  FVC: 1.97 (76%)  FEV1: 1.57 (79%)  FEV1/FVC: 80  T.11 (96%)  DLCO: 9.9 (50%)    Six Minute Walk Test:  None    ECHO Results:  None    Radiology:    2025:   There are no abnormal opacities that require further evaluation. There are noncalcified pulmonary nodule seen at the right lung apex the largest of which measures 5 mm. These are unchanged from what was seen on prior exam of 2024. there is consolidation within the lingula and right middle lobe of the lung slightly more prominent than what was seen on prior exam. Is the lungs show findings consistent with emphysema.     2024:   Stable biapical fibronodular changes. Centrilobular and paraseptal emphysema with an upper lung zone predominance, similar to prior exam. Mild mosaic attenuation of the bilateral pulmonary parenchyma which can be seen with small airways disease or increased pulmonary vascular resistance. Stable right apical pulmonary nodules, largest measuring 0.6 cm (series 4, image 97), unchanged compared to 2019. No new or enlarging pulmonary nodule. Several bilateral calcified granulomas. No consolidation, pleural effusion, or pneumothorax.      Assessment and Plan:     Pulmonary Nodule: Stable and unchanged right apical nodule compared to prior.  Consolidation mentioned by radiology appear chronic. No symptoms at this time or known dysphagia.  Continue yearly LDCT - Next in 2026    Emphysema: In the setting of tobacco use, now in cessation since . PFTs with continued stability. Emphysematous changes on CT stable. Very infrequently requiring Albuterol. No indication to  at this time.   Follow up in 1 year or if symptoms worsen.       Medication Refills today:  - Albuterol   "   Healthcare Maintenance  - Influenza: Due  Pneumovax/Prevnar: Due  COVID: 03/03/2021, 03/26/2021  - Tobacco Cessation: Complete  LDCT Screen: Yearly    Disposition:     Return to clinic in 1 year.    All Other Scheduled Appointments:   Future Appointments   Date Time Provider Department Center   8[facility]2025  [facility]   8/26/2025 11:15 AM Jenna Martinez MD [facility] [place]   2/2/2026  9:00 AM Carmen Devries MD [facility] [place]       Kinsey Gregg MD  [facility] Fellow

## 2025-08-01 ENCOUNTER — OFFICE VISIT (OUTPATIENT)
Dept: PULMONOLOGY | Facility: CLINIC | Age: 65
End: 2025-08-01
Payer: MEDICARE

## 2025-08-01 ENCOUNTER — HOSPITAL ENCOUNTER (OUTPATIENT)
Dept: PULMONOLOGY | Facility: CLINIC | Age: 65
Discharge: HOME | End: 2025-08-01
Payer: MEDICARE

## 2025-08-01 VITALS
OXYGEN SATURATION: 97 % | DIASTOLIC BLOOD PRESSURE: 78 MMHG | HEIGHT: 60 IN | BODY MASS INDEX: 28.07 KG/M2 | WEIGHT: 143 LBS | SYSTOLIC BLOOD PRESSURE: 140 MMHG | HEART RATE: 55 BPM

## 2025-08-01 DIAGNOSIS — J43.2 CENTRILOBULAR EMPHYSEMA: ICD-10-CM

## 2025-08-01 DIAGNOSIS — J44.9 CHRONIC OBSTRUCTIVE PULMONARY DISEASE, UNSPECIFIED COPD TYPE: ICD-10-CM

## 2025-08-01 DIAGNOSIS — Z72.0 TOBACCO USE: ICD-10-CM

## 2025-08-01 LAB
DLCO ADJ PRE: 11.47 ML/(MIN*MMHG) (ref 13.65–25.12)
DLCO SINGLE BREATH LLN: 13.65
DLCO SINGLE BREATH PRE REF: 54.3 %
DLCO SINGLE BREATH REF: 19.39
DLCOC SBVA LLN: 2.83
DLCOC SBVA PRE REF: 74.4 %
DLCOC SBVA REF: 4.54
DLCOC SINGLE BREATH LLN: 13.65
DLCOC SINGLE BREATH PRE REF: 59.2 %
DLCOC SINGLE BREATH REF: 19.39
DLCOCSBVAULN: 6.25
DLCOCSINGLEBREATHULN: 25.12
DLCOCSINGLEBREATHZSCORE: -2.27
DLCOSINGLEBREATHULN: 25.12
DLCOSINGLEBREATHZSCORE: -2.54
DLCOVA LLN: 2.83
DLCOVA PRE REF: 68.3 %
DLCOVA PRE: 3.1 ML/(MIN*MMHG*L) (ref 2.83–6.25)
DLCOVA REF: 4.54
DLCOVAULN: 6.25
DLVAADJ PRE: 3.38 ML/(MIN*MMHG*L) (ref 2.83–6.25)
ERV LLN: -16449.33
ERV PRE REF: 131.7 %
ERV REF: 0.67
ERVULN: ABNORMAL
FEF 25 75 LLN: 0.88
FEF 25 75 PRE REF: 64.7 %
FEF 25 75 REF: 1.83
FEV05 LLN: 0.71
FEV05 REF: 1.57
FEV1 FVC LLN: 66
FEV1 FVC PRE REF: 96.3 %
FEV1 FVC REF: 79
FEV1 LLN: 1.49
FEV1 PRE REF: 72.4 %
FEV1 REF: 2.02
FRCPLETH LLN: 1.66
FRCPLETH PREREF: 127.3 %
FRCPLETH REF: 2.48
FRCPLETHULN: 3.3
FVC LLN: 1.9
FVC PRE REF: 74.9 %
FVC REF: 2.56
IVC PRE: 1.76 L (ref 1.9–3.26)
IVC SINGLE BREATH LLN: 1.9
IVC SINGLE BREATH PRE REF: 68.7 %
IVC SINGLE BREATH REF: 2.56
IVCSINGLEBREATHULN: 3.26
LLN IC: -16448.43
PEF LLN: 3.83
PEF PRE REF: 75 %
PEF REF: 5.33
PHYSICIAN COMMENT: ABNORMAL
PRE DLCO: 10.52 ML/(MIN*MMHG) (ref 13.65–25.12)
PRE ERV: 0.88 L (ref -16449.33–16450.67)
PRE FEF 25 75: 1.19 L/S (ref 0.88–3.16)
PRE FET 100: 6.56 SEC
PRE FEV05 REF: 75.6 %
PRE FEV1 FVC: 76.19 % (ref 66.24–90.17)
PRE FEV1: 1.46 L (ref 1.49–2.52)
PRE FEV5: 1.18 L (ref 0.71–2.42)
PRE FRC PL: 3.16 L (ref 1.66–3.3)
PRE FVC: 1.92 L (ref 1.9–3.26)
PRE IC: 1.04 L (ref -16448.43–16451.57)
PRE PEF: 4 L/S (ref 3.83–6.84)
PRE REF IC: 66.3 %
PRE RV: 2.28 L (ref 1.24–2.39)
PRE TLC: 4.2 L (ref 3.28–5.26)
RAW PRE REF: 165.8 %
RAW PRE: 5.07 CMH2O*S/L (ref 3.06–3.06)
RAW REF: 3.06
REF IC: 1.57
RV LLN: 1.24
RV PRE REF: 125.6 %
RV REF: 1.81
RVTLC LLN: 32
RVTLC PRE REF: 131.2 %
RVTLC PRE: 54.31 % (ref 31.81–50.99)
RVTLC REF: 41
RVTLCULN: 51
RVULN: 2.39
SGAW PRE REF: 53.9 %
SGAW PRE: 0.06 1/(CMH2O*S) (ref 0.1–0.1)
SGAW REF: 0.1
TLC LLN: 3.28
TLC PRE REF: 98.3 %
TLC REF: 4.27
TLC ULN: 5.26
ULN IC: ABNORMAL
VA PRE: 3.39 L (ref 4.12–4.12)
VA SINGLE BREATH LLN: 4.12
VA SINGLE BREATH PRE REF: 82.4 %
VA SINGLE BREATH REF: 4.12
VASINGLEBREATHULN: 4.12
VC LLN: 1.9
VC PRE REF: 74.9 %
VC PRE: 1.92 L (ref 1.9–3.26)
VC REF: 2.56
VC ULN: 3.26

## 2025-08-01 PROCEDURE — 94726 PLETHYSMOGRAPHY LUNG VOLUMES: CPT | Mod: HCNC,S$GLB,, | Performed by: INTERNAL MEDICINE

## 2025-08-01 PROCEDURE — 99999 PR PBB SHADOW E&M-EST. PATIENT-LVL III: CPT | Mod: PBBFAC,HCNC,GC, | Performed by: INTERNAL MEDICINE

## 2025-08-01 PROCEDURE — 94729 DIFFUSING CAPACITY: CPT | Mod: HCNC,S$GLB,, | Performed by: INTERNAL MEDICINE

## 2025-08-01 PROCEDURE — 94010 BREATHING CAPACITY TEST: CPT | Mod: HCNC,S$GLB,, | Performed by: INTERNAL MEDICINE

## 2025-08-01 RX ORDER — ALBUTEROL SULFATE 90 UG/1
1-2 INHALANT RESPIRATORY (INHALATION) EVERY 4 HOURS PRN
Qty: 18 G | Refills: 5 | Status: SHIPPED | OUTPATIENT
Start: 2025-08-01

## 2025-08-01 NOTE — PROGRESS NOTES
I have reviewed the notes, assessments, and/or procedures performed by the fellow, I concur with her/his documentation of Alanna Angelo.  Date of Service: 8/1/2025

## 2025-08-06 ENCOUNTER — HOSPITAL ENCOUNTER (OUTPATIENT)
Dept: RADIOLOGY | Facility: HOSPITAL | Age: 65
Discharge: HOME | End: 2025-08-06
Attending: FAMILY MEDICINE
Payer: MEDICARE

## 2025-08-06 DIAGNOSIS — Z12.31 SCREENING MAMMOGRAM FOR BREAST CANCER: ICD-10-CM

## 2025-08-06 PROCEDURE — 77067 SCR MAMMO BI INCL CAD: CPT | Mod: TC,HCNC
